# Patient Record
Sex: MALE
[De-identification: names, ages, dates, MRNs, and addresses within clinical notes are randomized per-mention and may not be internally consistent; named-entity substitution may affect disease eponyms.]

---

## 2022-12-20 PROBLEM — Z00.00 ENCOUNTER FOR PREVENTIVE HEALTH EXAMINATION: Status: ACTIVE | Noted: 2022-12-20

## 2023-01-04 ENCOUNTER — APPOINTMENT (OUTPATIENT)
Dept: HEART AND VASCULAR | Facility: CLINIC | Age: 83
End: 2023-01-04
Payer: MEDICARE

## 2023-01-04 ENCOUNTER — NON-APPOINTMENT (OUTPATIENT)
Age: 83
End: 2023-01-04

## 2023-01-04 VITALS
HEIGHT: 71 IN | SYSTOLIC BLOOD PRESSURE: 122 MMHG | BODY MASS INDEX: 27.02 KG/M2 | DIASTOLIC BLOOD PRESSURE: 84 MMHG | HEART RATE: 50 BPM | RESPIRATION RATE: 12 BRPM | WEIGHT: 193 LBS

## 2023-01-04 DIAGNOSIS — I48.92 UNSPECIFIED ATRIAL FLUTTER: ICD-10-CM

## 2023-01-04 DIAGNOSIS — Z78.9 OTHER SPECIFIED HEALTH STATUS: ICD-10-CM

## 2023-01-04 DIAGNOSIS — R01.1 CARDIAC MURMUR, UNSPECIFIED: ICD-10-CM

## 2023-01-04 DIAGNOSIS — Z82.49 FAMILY HISTORY OF ISCHEMIC HEART DISEASE AND OTHER DISEASES OF THE CIRCULATORY SYSTEM: ICD-10-CM

## 2023-01-04 DIAGNOSIS — Z87.438 PERSONAL HISTORY OF OTHER DISEASES OF MALE GENITAL ORGANS: ICD-10-CM

## 2023-01-04 DIAGNOSIS — R09.89 OTHER SPECIFIED SYMPTOMS AND SIGNS INVOLVING THE CIRCULATORY AND RESPIRATORY SYSTEMS: ICD-10-CM

## 2023-01-04 PROCEDURE — 93306 TTE W/DOPPLER COMPLETE: CPT

## 2023-01-04 PROCEDURE — 93880 EXTRACRANIAL BILAT STUDY: CPT

## 2023-01-04 PROCEDURE — 93000 ELECTROCARDIOGRAM COMPLETE: CPT

## 2023-01-04 PROCEDURE — 99204 OFFICE O/P NEW MOD 45 MIN: CPT

## 2023-01-04 RX ORDER — APIXABAN 5 MG/1
5 TABLET, FILM COATED ORAL
Qty: 60 | Refills: 3 | Status: ACTIVE | COMMUNITY

## 2023-01-04 RX ORDER — SILODOSIN 8 MG/1
8 CAPSULE ORAL
Refills: 0 | Status: ACTIVE | COMMUNITY

## 2023-01-04 RX ORDER — METOPROLOL SUCCINATE 50 MG/1
50 TABLET, EXTENDED RELEASE ORAL DAILY
Refills: 0 | Status: ACTIVE | COMMUNITY

## 2023-01-04 RX ORDER — FINASTERIDE 5 MG/1
5 TABLET, FILM COATED ORAL
Refills: 0 | Status: ACTIVE | COMMUNITY

## 2023-01-04 NOTE — HISTORY OF PRESENT ILLNESS
[FreeTextEntry1] : 82-year-old male with a past medical history of paroxysmal atrial flutter BPH comes in for routine care.  Overall, feels well denies chest pain shortness of breath PND orthopnea.

## 2023-01-04 NOTE — PHYSICAL EXAM

## 2023-01-04 NOTE — DISCUSSION/SUMMARY
[FreeTextEntry1] : 1.  Atrial flutter: EKG reviewed now sinus bradycardia with deep inferolateral T wave inversions consistent with LVH and strain possible HOCM.  We will obtain an echocardiogram and advise once results are known.  For now continue metoprolol and Eliquis as currently dosed.\par 2.  Cardiac murmur: See above\par 3.  Carotid bruit: Carotid duplex [EKG obtained to assist in diagnosis and management of assessed problem(s)] : EKG obtained to assist in diagnosis and management of assessed problem(s)

## 2025-04-01 ENCOUNTER — APPOINTMENT (OUTPATIENT)
Dept: HEART AND VASCULAR | Facility: CLINIC | Age: 85
End: 2025-04-01

## 2025-04-01 ENCOUNTER — NON-APPOINTMENT (OUTPATIENT)
Age: 85
End: 2025-04-01

## 2025-04-01 ENCOUNTER — APPOINTMENT (OUTPATIENT)
Dept: HEART AND VASCULAR | Facility: CLINIC | Age: 85
End: 2025-04-01
Payer: MEDICARE

## 2025-04-01 VITALS
SYSTOLIC BLOOD PRESSURE: 116 MMHG | HEART RATE: 66 BPM | DIASTOLIC BLOOD PRESSURE: 84 MMHG | RESPIRATION RATE: 12 BRPM | WEIGHT: 188 LBS | HEIGHT: 71 IN | BODY MASS INDEX: 26.32 KG/M2

## 2025-04-01 DIAGNOSIS — I48.92 UNSPECIFIED ATRIAL FLUTTER: ICD-10-CM

## 2025-04-01 DIAGNOSIS — Z01.810 ENCOUNTER FOR PREPROCEDURAL CARDIOVASCULAR EXAMINATION: ICD-10-CM

## 2025-04-01 DIAGNOSIS — R09.89 OTHER SPECIFIED SYMPTOMS AND SIGNS INVOLVING THE CIRCULATORY AND RESPIRATORY SYSTEMS: ICD-10-CM

## 2025-04-01 DIAGNOSIS — R01.1 CARDIAC MURMUR, UNSPECIFIED: ICD-10-CM

## 2025-04-01 PROCEDURE — 93000 ELECTROCARDIOGRAM COMPLETE: CPT | Mod: NC

## 2025-04-01 PROCEDURE — 93306 TTE W/DOPPLER COMPLETE: CPT

## 2025-04-01 PROCEDURE — 99214 OFFICE O/P EST MOD 30 MIN: CPT

## 2025-04-01 PROCEDURE — G2211 COMPLEX E/M VISIT ADD ON: CPT

## 2025-04-01 RX ORDER — ALLOPURINOL 300 MG/1
300 TABLET ORAL
Refills: 0 | Status: ACTIVE | COMMUNITY

## 2025-04-09 ENCOUNTER — APPOINTMENT (OUTPATIENT)
Facility: CLINIC | Age: 85
End: 2025-04-09

## 2025-04-16 ENCOUNTER — APPOINTMENT (OUTPATIENT)
Dept: HEART AND VASCULAR | Facility: CLINIC | Age: 85
End: 2025-04-16
Payer: MEDICARE

## 2025-04-16 DIAGNOSIS — Z01.810 ENCOUNTER FOR PREPROCEDURAL CARDIOVASCULAR EXAMINATION: ICD-10-CM

## 2025-04-16 PROCEDURE — 93015 CV STRESS TEST SUPVJ I&R: CPT

## 2025-04-16 PROCEDURE — 96374 THER/PROPH/DIAG INJ IV PUSH: CPT | Mod: 59

## 2025-04-16 PROCEDURE — 78452 HT MUSCLE IMAGE SPECT MULT: CPT

## 2025-04-16 PROCEDURE — A9500: CPT | Mod: JZ

## 2025-05-02 ENCOUNTER — NON-APPOINTMENT (OUTPATIENT)
Age: 85
End: 2025-05-02

## 2025-05-02 ENCOUNTER — OUTPATIENT (OUTPATIENT)
Dept: OUTPATIENT SERVICES | Facility: HOSPITAL | Age: 85
LOS: 1 days | End: 2025-05-02
Payer: MEDICARE

## 2025-05-02 ENCOUNTER — APPOINTMENT (OUTPATIENT)
Dept: SURGERY | Facility: CLINIC | Age: 85
End: 2025-05-02
Payer: MEDICARE

## 2025-05-02 VITALS
WEIGHT: 193 LBS | DIASTOLIC BLOOD PRESSURE: 71 MMHG | HEIGHT: 71 IN | HEART RATE: 51 BPM | TEMPERATURE: 97.5 F | SYSTOLIC BLOOD PRESSURE: 118 MMHG | OXYGEN SATURATION: 98 % | BODY MASS INDEX: 27.02 KG/M2

## 2025-05-02 DIAGNOSIS — Z01.818 ENCOUNTER FOR OTHER PREPROCEDURAL EXAMINATION: ICD-10-CM

## 2025-05-02 DIAGNOSIS — K40.91 UNILATERAL INGUINAL HERNIA, W/OUT OBSTRUCTION OR GANGRENE, RECURRENT: ICD-10-CM

## 2025-05-02 DIAGNOSIS — K46.9 UNSPECIFIED ABDOMINAL HERNIA W/OUT OBSTRUCTION OR GANGRENE: ICD-10-CM

## 2025-05-02 DIAGNOSIS — Z87.19 OTHER SPECIFIED POSTPROCEDURAL STATES: ICD-10-CM

## 2025-05-02 DIAGNOSIS — Z98.890 OTHER SPECIFIED POSTPROCEDURAL STATES: ICD-10-CM

## 2025-05-02 LAB
ALBUMIN SERPL ELPH-MCNC: 4.1 G/DL
ALP BLD-CCNC: 103 U/L
ALT SERPL-CCNC: 16 U/L
ANION GAP SERPL CALC-SCNC: 10 MMOL/L
AST SERPL-CCNC: 24 U/L
BILIRUB SERPL-MCNC: 0.8 MG/DL
BUN SERPL-MCNC: 22 MG/DL
CALCIUM SERPL-MCNC: 8.9 MG/DL
CHLORIDE SERPL-SCNC: 105 MMOL/L
CO2 SERPL-SCNC: 25 MMOL/L
CREAT SERPL-MCNC: 1.12 MG/DL
EGFRCR SERPLBLD CKD-EPI 2021: 64 ML/MIN/1.73M2
GLUCOSE SERPL-MCNC: 92 MG/DL
POTASSIUM SERPL-SCNC: 4.9 MMOL/L
PROT SERPL-MCNC: 6.3 G/DL
SODIUM SERPL-SCNC: 141 MMOL/L

## 2025-05-02 PROCEDURE — 93010 ELECTROCARDIOGRAM REPORT: CPT | Mod: NC

## 2025-05-02 PROCEDURE — 93005 ELECTROCARDIOGRAM TRACING: CPT

## 2025-05-02 PROCEDURE — 99204 OFFICE O/P NEW MOD 45 MIN: CPT

## 2025-05-03 LAB
ESTIMATED AVERAGE GLUCOSE: 120 MG/DL
HBA1C MFR BLD HPLC: 5.8 %
HCT VFR BLD CALC: 38.3 %
HGB BLD-MCNC: 11.8 G/DL
MCHC RBC-ENTMCNC: 27.5 PG
MCHC RBC-ENTMCNC: 30.8 G/DL
MCV RBC AUTO: 89.3 FL
PLATELET # BLD AUTO: 228 K/UL
RBC # BLD: 4.29 M/UL
RBC # FLD: 14 %
WBC # FLD AUTO: 7.23 K/UL

## 2025-05-06 ENCOUNTER — APPOINTMENT (OUTPATIENT)
Dept: SURGERY | Facility: AMBULATORY SURGERY CENTER | Age: 85
End: 2025-05-06

## 2025-05-07 VITALS
TEMPERATURE: 97 F | HEART RATE: 49 BPM | RESPIRATION RATE: 16 BRPM | DIASTOLIC BLOOD PRESSURE: 69 MMHG | OXYGEN SATURATION: 97 % | WEIGHT: 194.89 LBS | SYSTOLIC BLOOD PRESSURE: 117 MMHG | HEIGHT: 71 IN

## 2025-05-07 RX ORDER — METOPROLOL SUCCINATE 50 MG/1
1 TABLET, EXTENDED RELEASE ORAL
Refills: 0 | DISCHARGE

## 2025-05-07 NOTE — ASU PATIENT PROFILE, ADULT - NSICDXPASTMEDICALHX_GEN_ALL_CORE_FT
PAST MEDICAL HISTORY:  Atrial flutter     BPH (benign prostatic hyperplasia)     Cardiac murmur      PAST MEDICAL HISTORY:  Atrial flutter     BPH (benign prostatic hyperplasia)     Cardiac murmur     Gout
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
## 2025-05-07 NOTE — ASU PATIENT PROFILE, ADULT - PAIN CHRONIC, PROFILE
Patient with decreased hearing, both ears. Neither tympanic membrane can be visualized. Patient states primary care could not clean ears or get it all in the past. Cerumen impactions debrided of both ears with the microscope with typical effort, suctions and curettes. Ear canals and eardrums normal after removal. This took 10 minutes.      
no

## 2025-05-08 ENCOUNTER — APPOINTMENT (OUTPATIENT)
Dept: SURGERY | Facility: HOSPITAL | Age: 85
End: 2025-05-08

## 2025-05-08 ENCOUNTER — OUTPATIENT (OUTPATIENT)
Dept: OUTPATIENT SERVICES | Facility: HOSPITAL | Age: 85
LOS: 1 days | End: 2025-05-08
Payer: MEDICARE

## 2025-05-08 ENCOUNTER — TRANSCRIPTION ENCOUNTER (OUTPATIENT)
Age: 85
End: 2025-05-08

## 2025-05-08 VITALS — HEART RATE: 72 BPM | OXYGEN SATURATION: 98 % | RESPIRATION RATE: 13 BRPM

## 2025-05-08 DIAGNOSIS — Z87.81 PERSONAL HISTORY OF (HEALED) TRAUMATIC FRACTURE: Chronic | ICD-10-CM

## 2025-05-08 DIAGNOSIS — Z98.890 OTHER SPECIFIED POSTPROCEDURAL STATES: Chronic | ICD-10-CM

## 2025-05-08 PROCEDURE — S2900: CPT

## 2025-05-08 PROCEDURE — 49651 LAP ING HERNIA REPAIR RECUR: CPT | Mod: AS,RT

## 2025-05-08 PROCEDURE — 49550 RPR REM HERNIA INIT REDUCE: CPT | Mod: RT

## 2025-05-08 PROCEDURE — C1781: CPT

## 2025-05-08 PROCEDURE — 49651 LAP ING HERNIA REPAIR RECUR: CPT | Mod: RT

## 2025-05-08 PROCEDURE — 49650 LAP ING HERNIA REPAIR INIT: CPT | Mod: AS,RT

## 2025-05-08 PROCEDURE — 36415 COLL VENOUS BLD VENIPUNCTURE: CPT

## 2025-05-08 PROCEDURE — 85610 PROTHROMBIN TIME: CPT

## 2025-05-08 PROCEDURE — C1727: CPT

## 2025-05-08 PROCEDURE — 85730 THROMBOPLASTIN TIME PARTIAL: CPT

## 2025-05-08 PROCEDURE — 49659 UNLSTD LAPS PX HRNAP HRNRPHY: CPT

## 2025-05-08 DEVICE — TROCAR COVIDIEN SPACEMAKER PRO BLUNT TIP 10MM-12MM WITH DISSECTION BALLOON OVAL: Type: IMPLANTABLE DEVICE | Site: RIGHT | Status: FUNCTIONAL

## 2025-05-08 DEVICE — MESH HERNIA INGUINAL 3DMAX EXTRA LARGE 12 X 17CM RIGHT: Type: IMPLANTABLE DEVICE | Site: RIGHT | Status: FUNCTIONAL

## 2025-05-08 RX ORDER — METOPROLOL SUCCINATE 50 MG/1
1 TABLET, EXTENDED RELEASE ORAL
Refills: 0 | DISCHARGE

## 2025-05-08 RX ORDER — ACETAMINOPHEN 500 MG/5ML
650 LIQUID (ML) ORAL ONCE
Refills: 0 | Status: DISCONTINUED | OUTPATIENT
Start: 2025-05-08 | End: 2025-05-08

## 2025-05-08 RX ORDER — DOCUSATE SODIUM 100 MG
1 CAPSULE ORAL
Qty: 20 | Refills: 0
Start: 2025-05-08

## 2025-05-08 RX ORDER — HYDROMORPHONE/SOD CHLOR,ISO/PF 2 MG/10 ML
0.25 SYRINGE (ML) INJECTION
Refills: 0 | Status: DISCONTINUED | OUTPATIENT
Start: 2025-05-08 | End: 2025-05-08

## 2025-05-08 RX ORDER — OXYCODONE HYDROCHLORIDE 30 MG/1
5 TABLET ORAL ONCE
Refills: 0 | Status: DISCONTINUED | OUTPATIENT
Start: 2025-05-08 | End: 2025-05-08

## 2025-05-08 RX ORDER — SODIUM CHLORIDE 9 G/1000ML
1000 INJECTION, SOLUTION INTRAVENOUS
Refills: 0 | Status: DISCONTINUED | OUTPATIENT
Start: 2025-05-08 | End: 2025-05-08

## 2025-05-08 NOTE — BRIEF OPERATIVE NOTE - NSICDXBRIEFPREOP_GEN_ALL_CORE_FT
PRE-OP DIAGNOSIS:  Right inguinal hernia 08-May-2025 07:23:22  Adiel Lin   PRE-OP DIAGNOSIS:  Recurrent inguinal hernia 08-May-2025 09:56:13  Ria Randolph

## 2025-05-08 NOTE — PROGRESS NOTE ADULT - SUBJECTIVE AND OBJECTIVE BOX
General Surgery Post-Op Note    Procedure: RA R IHR    Diagnosis/Indication: Inguinal hernia    Surgeon: Dr. Nicholson    S: Patient has no complaints. Pain controlled with medication, only feels mild discomfort. Denies nausea, vomiting, headache, dizziness, CP, SOB.    O:  T(C): 36.5 (05-08-25 @ 09:37), Max: 36.5 (05-08-25 @ 09:37)  T(F): 97.7 (05-08-25 @ 09:37), Max: 97.7 (05-08-25 @ 09:37)  HR: 60 (05-08-25 @ 11:37) (55 - 66)  BP: 127/62 (05-08-25 @ 11:37) (127/62 - 158/70)  RR: 13 (05-08-25 @ 11:37) (12 - 14)  SpO2: 96% (05-08-25 @ 11:37) (94% - 99%)  Wt(kg): --            Gen: NAD, resting comfortably in bed  C/V: NSR  Pulm: Nonlabored breathing, no respiratory distress  Abd: Soft, mild ivana-incisional tenderness, ND, Incisions C/D/I, scrotal support in place  Extrem: WWP, no calf edema, SCDs in place      A/P: 84yMale s/p above procedure doing well post-operatively, pending voiding.  Diet: Regular  IVF: IVHL  Pain/nausea control  DVT ppx: SCDs/OOBA  Dispo plan: Discharge

## 2025-05-08 NOTE — ASU DISCHARGE PLAN (ADULT/PEDIATRIC) - CARE PROVIDER_API CALL
Lyn Oscar, [facility]  [address], [address]  [place] [address]  Phone: [phone]  Fax: [phone]  Follow Up Time:

## 2025-05-08 NOTE — ASU DISCHARGE PLAN (ADULT/PEDIATRIC) - ASU DC SPECIAL INSTRUCTIONSFT
Follow up with Dr. Nicholson in 1-2 weeks. Call the office to schedule your appointment.    Please resume all regular home medications unless specifically advised not to take a particular medication. Also, please take any new medications as prescribed.    You should resume your eliquis the evening of 5/10.     To help reduce pain and inflammation, you can take tylenol and/or ibuprofen per the instructions on the bottle. You also have journavx to help reduce pain.     If you develop constipation, please use the prescribed stool softener until this resolves.     It is expected to have swelling and bruising of the scrotum after this operation. Please wear the scrotal support provided or tight briefs to help reduce this.     Please get plenty of rest, continue to ambulate several times per day, and drink adequate amounts of fluids. Avoid lifting weights greater than 5-10 lbs until you follow-up with your surgeon, who will instruct you further regarding activity restrictions. Avoid driving or operating heavy machinery while taking pain medications. You may shower letting soap and water run over incisions. Pat dry with clean towel when finished.    Call the office if you experience increasing abdominal pain, nausea, vomiting, or temperature >101 F. Follow up with Dr. Nicholson in 1-2 weeks. Call the office to schedule your appointment.    Please resume all regular home medications unless specifically advised not to take a particular medication. Also, please take any new medications as prescribed.    You should resume your eliquis the evening of 5/10.     To help reduce pain and inflammation, you can take tylenol and/or ibuprofen per the instructions on the bottle. You also have journavx to help reduce pain.    If you develop constipation, please use the prescribed stool softener until this resolves.     It is expected to have swelling and bruising of the scrotum after this operation. Please wear the scrotal support provided or tight briefs to help reduce this.     Please get plenty of rest, continue to ambulate several times per day, and drink adequate amounts of fluids. Avoid lifting weights greater than 5-10 lbs until you follow-up with your surgeon, who will instruct you further regarding activity restrictions. Avoid driving or operating heavy machinery while taking pain medications. You may shower letting soap and water run over incisions. Pat dry with clean towel when finished.    Call the office if you experience increasing abdominal pain, nausea, vomiting, or temperature >101 F.      If needed, please follow-up with the [facility] at [facility]  - Address: Samuel Tovar, [address] [place] [address], [place] [address]  - Phone: [phone] Follow up with Dr. Nicholson in 1-2 weeks. Call the office to schedule your appointment.    Please resume all regular home medications unless specifically advised not to take a particular medication. Also, please take any new medications as prescribed.    You should resume your eliquis the evening of 5/10.     To help reduce pain and inflammation, you can take tylenol and/or ibuprofen per the instructions on the bottle. You also have journavx to help reduce pain.    If you develop constipation, please use the prescribed stool softener until this resolves.     It is expected to have swelling and bruising of the scrotum after this operation. Please wear the scrotal support provided or tight briefs to help reduce this.     Please get plenty of rest, continue to ambulate several times per day, and drink adequate amounts of fluids. Avoid lifting weights greater than 5-10 lbs until you follow-up with your surgeon, who will instruct you further regarding activity restrictions. Avoid driving or operating heavy machinery while taking pain medications. You may shower letting soap and water run over incisions. Pat dry with clean towel when finished.    Call the office if you experience increasing abdominal pain, nausea, vomiting, or temperature >101 F.      If needed, please follow-up with the [facility] for Urology at [address] [address] [phone]

## 2025-05-08 NOTE — ASU DISCHARGE PLAN (ADULT/PEDIATRIC) - NS MD DC FALL RISK RISK
For information on Fall & Injury Prevention, visit: [email or web address] OR  [email or web address] OR  https://www.cdc.gov/steadi/patient.html

## 2025-05-08 NOTE — BRIEF OPERATIVE NOTE - NSICDXBRIEFPOSTOP_GEN_ALL_CORE_FT
POST-OP DIAGNOSIS:  Right inguinal hernia 08-May-2025 07:23:26  Adiel Lin   POST-OP DIAGNOSIS:  Recurrent inguinal hernia 08-May-2025 09:56:29  Ria Randolph

## 2025-05-08 NOTE — ASU DISCHARGE PLAN (ADULT/PEDIATRIC) - FINANCIAL ASSISTANCE
[facility] provides services at a reduced cost to those who are determined to be eligible through [facility]’s financial assistance program. Information regarding [facility]’s financial assistance program can be found by going to [email or web address] or by calling [phone].

## 2025-05-12 ENCOUNTER — NON-APPOINTMENT (OUTPATIENT)
Age: 85
End: 2025-05-12

## 2025-05-13 ENCOUNTER — NON-APPOINTMENT (OUTPATIENT)
Age: 85
End: 2025-05-13

## 2025-05-13 ENCOUNTER — INPATIENT (INPATIENT)
Facility: HOSPITAL | Age: 85
LOS: 1 days | Discharge: HOME HEALTH | DRG: 699 | End: 2025-05-15
Attending: INTERNAL MEDICINE | Admitting: INTERNAL MEDICINE
Payer: MEDICARE

## 2025-05-13 VITALS
TEMPERATURE: 98 F | SYSTOLIC BLOOD PRESSURE: 149 MMHG | HEIGHT: 71 IN | OXYGEN SATURATION: 97 % | DIASTOLIC BLOOD PRESSURE: 73 MMHG | HEART RATE: 51 BPM | RESPIRATION RATE: 16 BRPM | WEIGHT: 205.03 LBS

## 2025-05-13 DIAGNOSIS — Z87.81 PERSONAL HISTORY OF (HEALED) TRAUMATIC FRACTURE: Chronic | ICD-10-CM

## 2025-05-13 DIAGNOSIS — Z98.890 OTHER SPECIFIED POSTPROCEDURAL STATES: Chronic | ICD-10-CM

## 2025-05-13 PROBLEM — R01.1 CARDIAC MURMUR, UNSPECIFIED: Chronic | Status: ACTIVE | Noted: 2025-05-07

## 2025-05-13 PROBLEM — N40.0 BENIGN PROSTATIC HYPERPLASIA WITHOUT LOWER URINARY TRACT SYMPTOMS: Chronic | Status: ACTIVE | Noted: 2025-05-07

## 2025-05-13 PROBLEM — I48.92 UNSPECIFIED ATRIAL FLUTTER: Chronic | Status: ACTIVE | Noted: 2025-05-07

## 2025-05-13 PROBLEM — M10.9 GOUT, UNSPECIFIED: Chronic | Status: ACTIVE | Noted: 2025-05-07

## 2025-05-13 LAB
ADD ON TEST-SPECIMEN IN LAB: SIGNIFICANT CHANGE UP
ALBUMIN SERPL ELPH-MCNC: 3.2 G/DL — LOW (ref 3.3–5)
ALP SERPL-CCNC: 83 U/L — SIGNIFICANT CHANGE UP (ref 40–120)
ALT FLD-CCNC: 7 U/L — LOW (ref 10–45)
ANION GAP SERPL CALC-SCNC: 15 MMOL/L — SIGNIFICANT CHANGE UP (ref 5–17)
ANION GAP SERPL CALC-SCNC: 17 MMOL/L — SIGNIFICANT CHANGE UP (ref 5–17)
ANION GAP SERPL CALC-SCNC: 18 MMOL/L — HIGH (ref 5–17)
APPEARANCE UR: CLEAR — SIGNIFICANT CHANGE UP
AST SERPL-CCNC: 13 U/L — SIGNIFICANT CHANGE UP (ref 10–40)
BASOPHILS # BLD AUTO: 0.02 K/UL — SIGNIFICANT CHANGE UP (ref 0–0.2)
BASOPHILS NFR BLD AUTO: 0.2 % — SIGNIFICANT CHANGE UP (ref 0–2)
BILIRUB SERPL-MCNC: 0.4 MG/DL — SIGNIFICANT CHANGE UP (ref 0.2–1.2)
BILIRUB UR-MCNC: NEGATIVE — SIGNIFICANT CHANGE UP
BUN SERPL-MCNC: 106 MG/DL — HIGH (ref 7–23)
BUN SERPL-MCNC: 86 MG/DL — HIGH (ref 7–23)
BUN SERPL-MCNC: 98 MG/DL — HIGH (ref 7–23)
CALCIUM SERPL-MCNC: 8.5 MG/DL — SIGNIFICANT CHANGE UP (ref 8.4–10.5)
CALCIUM SERPL-MCNC: 9 MG/DL — SIGNIFICANT CHANGE UP (ref 8.4–10.5)
CALCIUM SERPL-MCNC: 9.4 MG/DL — SIGNIFICANT CHANGE UP (ref 8.4–10.5)
CHLORIDE SERPL-SCNC: 100 MMOL/L — SIGNIFICANT CHANGE UP (ref 96–108)
CHLORIDE SERPL-SCNC: 93 MMOL/L — LOW (ref 96–108)
CHLORIDE SERPL-SCNC: 98 MMOL/L — SIGNIFICANT CHANGE UP (ref 96–108)
CO2 SERPL-SCNC: 17 MMOL/L — LOW (ref 22–31)
CO2 SERPL-SCNC: 17 MMOL/L — LOW (ref 22–31)
CO2 SERPL-SCNC: 18 MMOL/L — LOW (ref 22–31)
COLOR SPEC: ABNORMAL
CREAT ?TM UR-MCNC: 47 MG/DL — SIGNIFICANT CHANGE UP
CREAT SERPL-MCNC: 10.89 MG/DL — HIGH (ref 0.5–1.3)
CREAT SERPL-MCNC: 7.62 MG/DL — HIGH (ref 0.5–1.3)
CREAT SERPL-MCNC: 9.13 MG/DL — HIGH (ref 0.5–1.3)
DIFF PNL FLD: ABNORMAL
EGFR: 4 ML/MIN/1.73M2 — LOW
EGFR: 4 ML/MIN/1.73M2 — LOW
EGFR: 5 ML/MIN/1.73M2 — LOW
EGFR: 5 ML/MIN/1.73M2 — LOW
EGFR: 6 ML/MIN/1.73M2 — LOW
EGFR: 6 ML/MIN/1.73M2 — LOW
EOSINOPHIL # BLD AUTO: 0.05 K/UL — SIGNIFICANT CHANGE UP (ref 0–0.5)
EOSINOPHIL NFR BLD AUTO: 0.4 % — SIGNIFICANT CHANGE UP (ref 0–6)
GLUCOSE SERPL-MCNC: 100 MG/DL — HIGH (ref 70–99)
GLUCOSE SERPL-MCNC: 143 MG/DL — HIGH (ref 70–99)
GLUCOSE SERPL-MCNC: 89 MG/DL — SIGNIFICANT CHANGE UP (ref 70–99)
GLUCOSE UR QL: NEGATIVE MG/DL — SIGNIFICANT CHANGE UP
HCT VFR BLD CALC: 33.6 % — LOW (ref 39–50)
HGB BLD-MCNC: 11.2 G/DL — LOW (ref 13–17)
IMM GRANULOCYTES NFR BLD AUTO: 0.4 % — SIGNIFICANT CHANGE UP (ref 0–0.9)
KETONES UR QL: ABNORMAL MG/DL
LEUKOCYTE ESTERASE UR-ACNC: ABNORMAL
LYMPHOCYTES # BLD AUTO: 1.1 K/UL — SIGNIFICANT CHANGE UP (ref 1–3.3)
LYMPHOCYTES # BLD AUTO: 9.7 % — LOW (ref 13–44)
MAGNESIUM SERPL-MCNC: 2.4 MG/DL — SIGNIFICANT CHANGE UP (ref 1.6–2.6)
MCHC RBC-ENTMCNC: 28.4 PG — SIGNIFICANT CHANGE UP (ref 27–34)
MCHC RBC-ENTMCNC: 33.3 G/DL — SIGNIFICANT CHANGE UP (ref 32–36)
MCV RBC AUTO: 85.1 FL — SIGNIFICANT CHANGE UP (ref 80–100)
MONOCYTES # BLD AUTO: 1.05 K/UL — HIGH (ref 0–0.9)
MONOCYTES NFR BLD AUTO: 9.3 % — SIGNIFICANT CHANGE UP (ref 2–14)
NEUTROPHILS # BLD AUTO: 9.06 K/UL — HIGH (ref 1.8–7.4)
NEUTROPHILS NFR BLD AUTO: 80 % — HIGH (ref 43–77)
NITRITE UR-MCNC: NEGATIVE — SIGNIFICANT CHANGE UP
NRBC BLD AUTO-RTO: 0 /100 WBCS — SIGNIFICANT CHANGE UP (ref 0–0)
OSMOLALITY SERPL: 310 MOSM/KG — HIGH (ref 280–301)
OSMOLALITY UR: 209 MOSM/KG — LOW (ref 300–900)
PH UR: 5.5 — SIGNIFICANT CHANGE UP (ref 5–8)
PHOSPHATE SERPL-MCNC: 5.5 MG/DL — HIGH (ref 2.5–4.5)
PLATELET # BLD AUTO: 222 K/UL — SIGNIFICANT CHANGE UP (ref 150–400)
POTASSIUM SERPL-MCNC: 5.7 MMOL/L — HIGH (ref 3.5–5.3)
POTASSIUM SERPL-MCNC: 6 MMOL/L — HIGH (ref 3.5–5.3)
POTASSIUM SERPL-MCNC: 7.8 MMOL/L — CRITICAL HIGH (ref 3.5–5.3)
POTASSIUM SERPL-SCNC: 5.7 MMOL/L — HIGH (ref 3.5–5.3)
POTASSIUM SERPL-SCNC: 6 MMOL/L — HIGH (ref 3.5–5.3)
POTASSIUM SERPL-SCNC: 7.8 MMOL/L — CRITICAL HIGH (ref 3.5–5.3)
POTASSIUM UR-SCNC: 28 MMOL/L — SIGNIFICANT CHANGE UP
PROT ?TM UR-MCNC: 11 MG/DL — SIGNIFICANT CHANGE UP (ref 0–12)
PROT SERPL-MCNC: 6.1 G/DL — SIGNIFICANT CHANGE UP (ref 6–8.3)
PROT UR-MCNC: 100 MG/DL
PROT/CREAT UR-RTO: 0.2 RATIO — SIGNIFICANT CHANGE UP (ref 0–0.2)
RBC # BLD: 3.95 M/UL — LOW (ref 4.2–5.8)
RBC # FLD: 13 % — SIGNIFICANT CHANGE UP (ref 10.3–14.5)
SODIUM SERPL-SCNC: 127 MMOL/L — LOW (ref 135–145)
SODIUM SERPL-SCNC: 131 MMOL/L — LOW (ref 135–145)
SODIUM SERPL-SCNC: 135 MMOL/L — SIGNIFICANT CHANGE UP (ref 135–145)
SODIUM UR-SCNC: 21 MMOL/L — SIGNIFICANT CHANGE UP
SP GR SPEC: 1.01 — SIGNIFICANT CHANGE UP (ref 1–1.03)
UROBILINOGEN FLD QL: 0.2 MG/DL — SIGNIFICANT CHANGE UP (ref 0.2–1)
UUN UR-MCNC: 299 MG/DL — SIGNIFICANT CHANGE UP
WBC # BLD: 11.32 K/UL — HIGH (ref 3.8–10.5)
WBC # FLD AUTO: 11.32 K/UL — HIGH (ref 3.8–10.5)

## 2025-05-13 PROCEDURE — 93010 ELECTROCARDIOGRAM REPORT: CPT

## 2025-05-13 PROCEDURE — 99223 1ST HOSP IP/OBS HIGH 75: CPT

## 2025-05-13 PROCEDURE — 99291 CRITICAL CARE FIRST HOUR: CPT | Mod: FS

## 2025-05-13 RX ORDER — SODIUM ZIRCONIUM CYCLOSILICATE 5 G/5G
10 POWDER, FOR SUSPENSION ORAL ONCE
Refills: 0 | Status: COMPLETED | OUTPATIENT
Start: 2025-05-13 | End: 2025-05-13

## 2025-05-13 RX ORDER — SODIUM CHLORIDE 9 G/1000ML
1000 INJECTION, SOLUTION INTRAVENOUS
Refills: 0 | Status: DISCONTINUED | OUTPATIENT
Start: 2025-05-13 | End: 2025-05-14

## 2025-05-13 RX ORDER — SODIUM ZIRCONIUM CYCLOSILICATE 5 G/5G
10 POWDER, FOR SUSPENSION ORAL ONCE
Refills: 0 | Status: DISCONTINUED | OUTPATIENT
Start: 2025-05-14 | End: 2025-05-14

## 2025-05-13 RX ORDER — SODIUM ZIRCONIUM CYCLOSILICATE 5 G/5G
10 POWDER, FOR SUSPENSION ORAL ONCE
Refills: 0 | Status: DISCONTINUED | OUTPATIENT
Start: 2025-05-13 | End: 2025-05-13

## 2025-05-13 RX ORDER — CALCIUM GLUCONATE 20 MG/ML
1 INJECTION, SOLUTION INTRAVENOUS ONCE
Refills: 0 | Status: COMPLETED | OUTPATIENT
Start: 2025-05-13 | End: 2025-05-13

## 2025-05-13 RX ORDER — ACETAMINOPHEN 500 MG/5ML
650 LIQUID (ML) ORAL EVERY 6 HOURS
Refills: 0 | Status: DISCONTINUED | OUTPATIENT
Start: 2025-05-13 | End: 2025-05-15

## 2025-05-13 RX ORDER — DEXTROSE 50 % IN WATER 50 %
50 SYRINGE (ML) INTRAVENOUS ONCE
Refills: 0 | Status: COMPLETED | OUTPATIENT
Start: 2025-05-13 | End: 2025-05-13

## 2025-05-13 RX ORDER — SODIUM ZIRCONIUM CYCLOSILICATE 5 G/5G
10 POWDER, FOR SUSPENSION ORAL ONCE
Refills: 0 | Status: COMPLETED | OUTPATIENT
Start: 2025-05-14 | End: 2025-05-14

## 2025-05-13 RX ORDER — FUROSEMIDE 10 MG/ML
60 INJECTION INTRAMUSCULAR; INTRAVENOUS ONCE
Refills: 0 | Status: COMPLETED | OUTPATIENT
Start: 2025-05-13 | End: 2025-05-13

## 2025-05-13 RX ORDER — ALBUTEROL SULFATE 2.5 MG/3ML
2.5 VIAL, NEBULIZER (ML) INHALATION ONCE
Refills: 0 | Status: COMPLETED | OUTPATIENT
Start: 2025-05-13 | End: 2025-05-13

## 2025-05-13 RX ORDER — MELATONIN 5 MG
3 TABLET ORAL AT BEDTIME
Refills: 0 | Status: DISCONTINUED | OUTPATIENT
Start: 2025-05-13 | End: 2025-05-15

## 2025-05-13 RX ORDER — DEXTROSE 50 % IN WATER 50 %
50 SYRINGE (ML) INTRAVENOUS ONCE
Refills: 0 | Status: DISCONTINUED | OUTPATIENT
Start: 2025-05-13 | End: 2025-05-13

## 2025-05-13 RX ADMIN — SODIUM ZIRCONIUM CYCLOSILICATE 10 GRAM(S): 5 POWDER, FOR SUSPENSION ORAL at 19:22

## 2025-05-13 RX ADMIN — SODIUM ZIRCONIUM CYCLOSILICATE 10 GRAM(S): 5 POWDER, FOR SUSPENSION ORAL at 15:12

## 2025-05-13 RX ADMIN — SODIUM ZIRCONIUM CYCLOSILICATE 10 GRAM(S): 5 POWDER, FOR SUSPENSION ORAL at 21:37

## 2025-05-13 RX ADMIN — SODIUM CHLORIDE 300 MILLILITER(S): 9 INJECTION, SOLUTION INTRAVENOUS at 22:52

## 2025-05-13 RX ADMIN — Medication 5 UNIT(S): at 15:27

## 2025-05-13 RX ADMIN — Medication 200 MILLILITER(S): at 17:02

## 2025-05-13 RX ADMIN — CALCIUM GLUCONATE 100 GRAM(S): 20 INJECTION, SOLUTION INTRAVENOUS at 17:00

## 2025-05-13 RX ADMIN — FUROSEMIDE 60 MILLIGRAM(S): 10 INJECTION INTRAMUSCULAR; INTRAVENOUS at 16:59

## 2025-05-13 RX ADMIN — CALCIUM GLUCONATE 100 GRAM(S): 20 INJECTION, SOLUTION INTRAVENOUS at 15:35

## 2025-05-13 RX ADMIN — Medication 200 MILLILITER(S): at 18:45

## 2025-05-13 RX ADMIN — Medication 50 MILLILITER(S): at 15:22

## 2025-05-13 RX ADMIN — Medication 300 MILLILITER(S): at 21:45

## 2025-05-13 RX ADMIN — Medication 100 MILLIGRAM(S): at 19:23

## 2025-05-13 RX ADMIN — Medication 2.5 MILLIGRAM(S): at 15:12

## 2025-05-13 NOTE — CONSULT NOTE ADULT - SUBJECTIVE AND OBJECTIVE BOX
83yo Male pt with PMH BPH, GOUT, afib (on Eliquis, last this AM), now POD 5 s/p RA RT inguinal hernia repair w/ mesh ([facility]), presents to the ED reporting x4-5 days of difficulty urinating, short/weak streams, worsened this AM prompting ED visit. Pt states he required straight cath post-op was instructed to straight cath at home as needed. Pt denies any fever, chills, CP, SOB, LE swelling.    In the ED, pt afebrile, nontachycardic, normotensive, and satting on RA.     On exam, abd soft, NT, ND, incisions c/d/i w/ surrounding ecchymosis.      Labs demonstrate WBC 11.3, Na 127, K 7.8, Cl 93, Cr 10.8    PMH: Gout, BPH  PSHx: RT inguinal hernia repair   Medications: Eliquis (last taken this AM), Metoprolol  Allergies: NA  Social Hx: Denies  Last colonoscopy: NA    T(C): 36.8 (05-13-25 @ 12:36), Max: 36.8 (05-13-25 @ 12:36)  HR: 51 (05-13-25 @ 12:36) (51 - 51)  BP: 149/73 (05-13-25 @ 12:36) (149/73 - 149/73)  RR: 16 (05-13-25 @ 12:36) (16 - 16)  SpO2: 97% (05-13-25 @ 12:36) (97% - 97%)    Physical Exam  General: AAOx3, NAD, laying comfortably in bed  Cardio: S1,S2, No MRG  Pulm: Nonlabored breathing  Abdomen: Soft, NT, ND, no rebound/guarding, incisions c/d/i  Extremities: WWP, peripheral pulses appreciated      LABS:                        11.2   11.32 )-----------( 222      ( 13 May 2025 13:54 )             33.6     05-13    x   |  x   |  x   ----------------------------<  89  x    |  x   |  x     Ca    8.5      13 May 2025 13:54

## 2025-05-13 NOTE — CONSULT NOTE ADULT - ASSESSMENT
Mr. Esquivel is an 84-year-old male with past medical history of A-fib on Eliquis, hypertension, BPH, gout who underwent right inguinal hernia repair on 5/8 with post procedural urinary retention leading to acute renal failure, hyperkalemia, and post-obstructive diuresis. ICU consulted for disposition in management of these conditions.     Neuro:   JERONIMO    Cardiac:   Atrial Fibrillation: Slow ventricular response at this time with Hr of 45-55, patient takes Toprol 25 daily and Eliquis 5 g PO BID.   - Hold Toprol given slow ventricular response, previously noted on Pre-operative EKG.   - Hold Eliquis given ongoing hematuria and concern for catheter placement, improving.     Hypertension: Not currently on antihypertensives outside of BPH medications and Toprol. Continue to monitor.     Pulmonary:   JERONIMO    Gastroenterology:   JERONIMO    Nephrology:   Acute renal Failure secondary to obstructive uropathy with chronic BPH c/b hyperkalemia: Patient with chronic BPH with post procedural urinary retention on 5/8, was meant to straight cath at home  but did not unsure if he was able to urinate in the last 5 days. He presents with 1500ccs on bladder scan, s/p capps placement with 2L output. FE urea/FENA with intrinsic disease, likely in the setting of severe obstruction.     - Nephrology consulted, appreciate recs.   - Urine studies sent, follow up FENA.   - Strict hourly urine outputs, attempt to replete 1/2 of hourly urine output with NS or LR.   - Continue to trend BMPs q4 hours, if potassium improves will have no dialysis needs.     Hyperkalemia: From acute renal failure no EKG changes noted.  - Management with Calcium Gluconate 2 grams, Insulin 5 units IVP, Albuterol inhaler, Lasix per Nephrology   - Continue with Lokelma 10 g q4 hours.   - Trend potassium every 4 hours to ensure no rebound.     Hyponatremia: Na of 127, patient with elevated total body water given renal failure vs SIADH from urinary obstruction. Interestingly serum osmolarity elevated at 310 though likely with increased BUN, urine osm low at 208 though this is after obstruction was relieved.  Patient asymptomatic. Lasix administered in the ED.   - Follow up repeat Na, will likely resolve.      # Metabolic Acidosis: Patient with HAGMA, likely in the setting of uremia as above. D/D of 0.78 so possible mixed picture, though at this time no symptoms to explain. Will likely resolve with correction of uremia.   - Continue to monitor.     Gout: Hold home allopurinol 300 mg daily, AM uric acid testing.     BPH: Given Acute renal failure hold Finasteride 5mg PO daily and Silodosin 8.   - Discuss restarting medications in the AM.     F: As above.   E: Avoid repletion.   N: Renal diet.   DVT ppx: SCDs  Dispo: Pending BMP results.      Mr. Esquivel is an 84-year-old male with past medical history of A-fib on Eliquis, hypertension, BPH, gout who underwent right inguinal hernia repair on 5/8 with post procedural urinary retention leading to acute renal failure, hyperkalemia, and post-obstructive diuresis. ICU consulted for disposition in management of these conditions.     Neuro:   JERONIMO    Cardiac:   # Atrial Fibrillation: Slow ventricular response at this time with Hr of 45-55, patient takes Toprol 25 daily and Eliquis 5 g PO BID.   - Hold Toprol given slow ventricular response, previously noted on Pre-operative EKG.   - Hold Eliquis given ongoing hematuria and concern for catheter placement, improving.     # Hypertension: Not currently on antihypertensives outside of BPH medications and Toprol. Continue to monitor.     Pulmonary:   JERONIMO    Gastroenterology:   JERONIMO    Nephrology:   # Acute renal Failure secondary to obstructive uropathy with chronic BPH c/b hyperkalemia: Patient with chronic BPH with post procedural urinary retention on 5/8, was meant to straight cath at home  but did not unsure if he was able to urinate in the last 5 days. He presents with 1500ccs on bladder scan, s/p capps placement with 2L output. FE urea/FENA with intrinsic disease, likely in the setting of severe obstruction.     - Nephrology consulted, appreciate recs.   - Urine studies sent, follow up FENA.   - Strict hourly urine outputs, attempt to replete 1/2 of hourly urine output with NS or LR.   - Continue to trend BMPs q4 hours, if potassium improves will have no dialysis needs.     # Hyperkalemia: From acute renal failure no EKG changes noted.  - Management with Calcium Gluconate 2 grams, Insulin 5 units IVP, Albuterol inhaler, Lasix per Nephrology   - Continue with Lokelma 10 g q4 hours.   - Trend potassium every 4 hours to ensure no rebound.     # Hyponatremia: Na of 127, patient with elevated total body water given renal failure vs SIADH from urinary obstruction. Interestingly serum osmolarity elevated at 310 though likely with increased BUN, urine osm low at 208 though this is after obstruction was relieved.  Patient asymptomatic. Lasix administered in the ED.   - Follow up repeat Na, will likely resolve.      # Metabolic Acidosis: Patient with HAGMA, likely in the setting of uremia as above. D/D of 0.78 so possible mixed picture, though at this time no symptoms to explain. Will likely resolve with correction of uremia.   - Continue to monitor.     # Gout: Hold home allopurinol 300 mg daily, AM uric acid testing.     Urology:   # BPH: Given Acute renal failure hold Finasteride 5mg PO daily and Silodosin 8.   - Discuss restarting medications in the AM.     Heme:   # Anemia: Normocytic anemia at 11.3, down from 11.8 pre-surgery.   - Follow up routine cancer screening history.     F: As above.   E: Avoid repletion.   N: Renal diet.   DVT ppx: SCDs  Dispo: Pending BMP results.      Mr. Esquivel is an 84-year-old male with past medical history of A-fib on Eliquis, hypertension, BPH, gout who underwent right inguinal hernia repair on 5/8 with post procedural urinary retention leading to acute renal failure, hyperkalemia, and post-obstructive diuresis. ICU consulted for disposition in management of these conditions.     Neuro:   JERONIMO    Cardiac:   # Atrial Fibrillation: Slow ventricular response at this time with Hr of 45-55, patient takes Toprol 25 daily and Eliquis 5 g PO BID.   - Hold Toprol given slow ventricular response, previously noted on Pre-operative EKG.   - Hold Eliquis given ongoing hematuria and concern for catheter placement, improving.     # Hypertension: Not currently on antihypertensives outside of BPH medications and Toprol. Continue to monitor.     Pulmonary:   JERONIMO    Gastroenterology:   JERONIMO    Nephrology:   # Acute renal Failure secondary to obstructive uropathy with chronic BPH c/b hyperkalemia: Patient with chronic BPH with post procedural urinary retention on 5/8, was meant to straight cath at home  but did not unsure if he was able to urinate in the last 5 days. He presents with 1500ccs on bladder scan, s/p capps placement with 2L output. FE urea/FENA with intrinsic disease, likely in the setting of severe obstruction.     - Nephrology consulted, appreciate recs.   - Urine studies sent, follow up FENA.   - Strict hourly urine outputs, attempt to replete 1/2 of hourly urine output with NS or LR.   - Continue to trend BMPs q4 hours, if potassium improves will have no dialysis needs.     # Hyperkalemia: From acute renal failure no EKG changes noted.  - Management with Calcium Gluconate 2 grams, Insulin 5 units IVP, Albuterol inhaler, Lasix per Nephrology   - Continue with Lokelma 10 g q4 hours.   - Trend potassium every 4 hours to ensure no rebound.     # Hyponatremia: Na of 127, patient with elevated total body water given renal failure vs SIADH from urinary obstruction. Interestingly serum osmolarity elevated at 310 though likely with increased BUN, urine osm low at 208 though this is after obstruction was relieved.  Patient asymptomatic. Lasix administered in the ED.   - Follow up repeat Na, will likely resolve.      # Metabolic Acidosis: Patient with HAGMA, likely in the setting of uremia as above. D/D of 0.78 so possible mixed picture, though at this time no symptoms to explain. Will likely resolve with correction of uremia.   - Continue to monitor.     # Gout: Hold home allopurinol 300 mg daily, AM uric acid testing.     Urology:   # BPH: Given Acute renal failure hold Finasteride 5mg PO daily and Silodosin 8.   - Discuss restarting medications in the AM.     Heme:   # Anemia: Normocytic anemia at 11.3, down from 11.8 pre-surgery.   - Follow up routine cancer screening history.     F: As above.   E: Avoid repletion.   N: Renal diet.   DVT ppx: SCDs  Dispo: [facility]      Mr. Esquivel is an 84-year-old male with past medical history of A-fib on Eliquis, hypertension, BPH, gout who underwent right inguinal hernia repair on 5/8 with post procedural urinary retention leading to acute renal failure, hyperkalemia, and post-obstructive diuresis. ICU consulted for disposition in management of these conditions.     Neuro:   JERONIMO    Cardiac:   # Atrial Fibrillation: Slow ventricular response at this time with Hr of 45-55, patient takes Toprol 25 daily and Eliquis 5 g PO BID.   - Hold Toprol given slow ventricular response, previously noted on Pre-operative EKG.   - Hold Eliquis given ongoing hematuria and concern for catheter placement, improving.     # Hypertension: Not currently on antihypertensives outside of BPH medications and Toprol. Continue to monitor.     Pulmonary:   JERONIMO    Gastroenterology:   JERONIMO    Nephrology:   # Acute renal Failure secondary to obstructive uropathy with chronic BPH c/b hyperkalemia: Patient with chronic BPH with post procedural urinary retention on 5/8, was meant to straight cath at home  but did not unsure if he was able to urinate in the last 5 days. He presents with 1500ccs on bladder scan, s/p capps placement with 2L output. FE urea/FENA with intrinsic disease, likely in the setting of severe obstruction.     - Nephrology consulted, appreciate recs.   - Urine studies sent, follow up FENA.   - Strict hourly urine outputs, attempt to replete 1/2 of hourly urine output with NS or LR.   - Continue to trend BMPs q4 hours, if potassium improves will have no dialysis needs.     # Hyperkalemia: From acute renal failure no EKG changes noted.  - Management with Calcium Gluconate 2 grams, Insulin 5 units IVP, Albuterol inhaler, Lasix per Nephrology   - Continue with Lokelma 10 g q4 hours.   - Trend potassium every 4 hours to ensure no rebound.     # Hyponatremia: Na of 127, patient with elevated total body water given renal failure vs SIADH from urinary obstruction. Interestingly serum osmolarity elevated at 310 though likely with increased BUN, urine osm low at 208 though this is after obstruction was relieved.  Patient asymptomatic. Lasix administered in the ED.   - Follow up repeat Na, will likely resolve.      # Metabolic Acidosis: Patient with HAGMA, likely in the setting of uremia as above. D/D of 0.78 so possible mixed picture, though at this time no symptoms to explain. Will likely resolve with correction of uremia.   - Continue to monitor.     # Gout: Hold home allopurinol 300 mg daily, AM uric acid testing.     Urology:   # BPH: Given Acute renal failure hold Finasteride 5mg PO daily and Silodosin 8.   - Discuss restarting medications in the AM.     # Hematuria: Secondary to obstruction with ongoing AC use.   - Hold AC for tonight can restart once resolved.     Heme:   # Anemia: Normocytic anemia at 11.3, down from 11.8 pre-surgery.   - Follow up routine cancer screening history.     F: As above.   E: Avoid repletion.   N: Renal diet.   DVT ppx: SCDs  Dispo: [facility]

## 2025-05-13 NOTE — ED PROVIDER NOTE - CRITICAL CARE ATTENDING CONTRIBUTION TO CARE
84 M HTN, BPH s/p recent inguinal hernia surgery, with inability to urinate x 3 days now sent for eval of renal failure and hyperK.  Pt looks well, nad.  Fully bladder, mild ttp, no CVAT.  Hernia site clean dry intact w/o infection.  Labs noted with sig renal failure and hyperK.  EKG:  NSR, no peaked twaves no ischemia; CXR clear no effusions.  Renal and MICU consulted.  Samson placed. TX for hyperK improved numbers.  Will admit for further tx and workup.

## 2025-05-13 NOTE — H&P ADULT - ASSESSMENT
84-year-old male with past medical history of A-fib on Eliquis, hypertension, BPH, gout who underwent right inguinal hernia repair on 5/8 with post procedural urinary retention leading to acute renal failure, hyperkalemia, and post-obstructive diuresis. ICU consulted for disposition in management of these conditions, admitted to        Neuro:   JERONIMO    Cardiac:   # Atrial Fibrillation: Slow ventricular response at this time with Hr of 45-55, patient takes Toprol 25 daily and Eliquis 5 g PO BID.   - Hold Toprol given slow ventricular response, previously noted on Pre-operative EKG.   - Hold Eliquis given ongoing hematuria and concern for catheter placement, improving.     # Hypertension: Not currently on antihypertensives outside of BPH medications and Toprol. Continue to monitor.     Pulmonary:   JERONIMO    Gastroenterology:   JERONIMO    Nephrology:   # Acute renal Failure secondary to obstructive uropathy with chronic BPH c/b hyperkalemia: Patient with chronic BPH with post procedural urinary retention on 5/8, was meant to straight cath at home  but did not unsure if he was able to urinate in the last 5 days. He presents with 1500ccs on bladder scan, s/p capps placement with 2L output. FE urea/FENA with intrinsic disease, likely in the setting of severe obstruction.     - Nephrology consulted, appreciate recs.   - Urine studies sent, follow up FENA.   - Strict hourly urine outputs, attempt to replete 1/2 of hourly urine output with NS or LR.   - Continue to trend BMPs q4 hours, if potassium improves will have no dialysis needs.     # Hyperkalemia: From acute renal failure no EKG changes noted.  - Management with Calcium Gluconate 2 grams, Insulin 5 units IVP, Albuterol inhaler, Lasix per Nephrology   - Continue with Lokelma 10 g q4 hours.   - Trend potassium every 4 hours to ensure no rebound.     # Hyponatremia: Na of 127, patient with elevated total body water given renal failure vs SIADH from urinary obstruction. Interestingly serum osmolarity elevated at 310 though likely with increased BUN, urine osm low at 208 though this is after obstruction was relieved.  Patient asymptomatic. Lasix administered in the ED.   - Follow up repeat Na, will likely resolve.      # Metabolic Acidosis: Patient with HAGMA, likely in the setting of uremia as above. D/D of 0.78 so possible mixed picture, though at this time no symptoms to explain. Will likely resolve with correction of uremia.   - Continue to monitor.     # Gout: Hold home allopurinol 300 mg daily, AM uric acid testing.     Urology:   # BPH: Given Acute renal failure hold Finasteride 5mg PO daily and Silodosin 8.   - Discuss restarting medications in the AM.     # Hematuria: Secondary to obstruction with ongoing AC use.   - Hold AC for tonight can restart once resolved.     Heme:   # Anemia: Normocytic anemia at 11.3, down from 11.8 pre-surgery.   - Follow up routine cancer screening history.     F: As above.   E: Avoid repletion.   N: Renal diet.   DVT ppx: SCDs  Dispo: [facility]

## 2025-05-13 NOTE — H&P ADULT - NSICDXPASTMEDICALHX_GEN_ALL_CORE_FT
PAST MEDICAL HISTORY:  Atrial flutter     BPH (benign prostatic hyperplasia)     Cardiac murmur     Gout

## 2025-05-13 NOTE — ED PROVIDER NOTE - PHYSICAL EXAMINATION
CONSTITUTIONAL: Well-appearing;  in no apparent distress.   HEAD: Normocephalic; atraumatic.   EYES: PERRL; EOM intact; conjunctiva and sclera clear  ENT: normal nose; no rhinorrhea; normal pharynx with no erythema or lesions.   NECK: Supple; non-tender; no LAD  CARDIOVASCULAR: Normal S1, S2; No audible murmurs. Regular rate and rhythm.   RESPIRATORY: Breathing easily; breath sounds clear and equal bilaterally; no wheezes, rhonchi, or rales.  GI: Soft; +distension, + ecchymosis to lower abd, incisions healing well. bedside bladder scan 891ccs.   MSK: FROM at all extremities, normal tone   EXT: No cyanosis or edema; N/V intact  SKIN: Normal for age and race; warm; dry; good turgor; no apparent lesions or rash.   NEURO: A & O x 3; face symmetric; grossly unremarkable.   PSYCHOLOGICAL: The patient’s mood and manner are appropriate.

## 2025-05-13 NOTE — PATIENT PROFILE ADULT - FALL HARM RISK - DEVICES
[facility], [place]  BERNARD Consult      Erendira Pretty  YOB: 1947  Medical Record Number:  [id number]  Attending Physician:  Horacio Huerta MD   Current Principal Problem:  Paroxysmal atrial fibrillation    History     Cc: watchman    HPI  77 yoM CAD/CABG here for watchman placement.      3/24: AF noted 8/23. He was stared on rythmol but no rhythm control via CV attempt was made. He remained in AF 2/7/24, SR with long first degree AVB later in February. He had a vascular intervention in his abdomen 1/24 with subsequent GI bleeding later that month. He has been off OAC since the GI bleeding event. Xarelto was eventually restarted.      5/24: GI bleeding 4/27/24 with no source identified. Off xarelto since discharge. Remains in SR on rythmol with first degree AVB. Due for PVI 6/24/24, LAAO 6w later     10/24: No sustained recurrence. LAAO scheduled 11/8/24. He is on aspirin alone.     CHADSVASC of 4  HAS BLED of 4  EF 60-65%    Dysphagia or odynophagia:  No  Liver Disease, esophageal disease, or known varices:  yes but no varices on most recent upper  Upper GI Bleeding: yes  Snoring:  No  Sleep Apnea:  No  Prior neck surgery or radiation:  No  History of anesthetic difficulties:  No  Family history of anesthetic difficulties:  No  Last oral intake: yesterday before midnight  Able to move neck in all directions:  Yes    Medications - Outpatient  Prior to Admission medications    Medication Sig Start Date End Date Taking? Authorizing Provider   albuterol (PROVENTIL HFA) 90 mcg/actuation inhaler Inhale 2 puffs into the lungs every 6 (six) hours as needed for Wheezing. Rescue   Yes Provider, Historical   amLODIPine (NORVASC) 5 MG tablet Take 1 tablet (5 mg total) by mouth once daily. 10/22/24 10/17/25 Yes Frank Gallardo MD   aspirin (ECOTRIN) 81 MG EC tablet Take 1 tablet (81 mg total) by mouth once daily. 4/10/24 4/10/25 Yes Frank Gallardo MD   budesonide-formoterol 160-4.5  mcg (SYMBICORT) 160-4.5 mcg/actuation HFAA INHALE 2 PUFFS INTO THE LUNGS TWO TIMES A DAY. 5/10/24  Yes Rishi Molina MD   carvediloL (COREG) 3.125 MG tablet Take 1 tablet (3.125 mg total) by mouth 2 (two) times daily with meals. 9/28/24 9/28/25 Yes Frank Gallardo MD   cimetidine (TAGAMET) 300 MG tablet Take one tablet by mouth on 11/7/2024 at 4:15 pm, one tablet on 11/7/2024 at 10:15 pm, and then one tablet on 11/8/2024 at 5:15 am for pre-procedure 9/18/24  Yes Horacio Huerta MD   diphenhydrAMINE (BENADRYL) 50 MG capsule Take one tablet by mouth on 11/7/2024 at 4:15 pm, one tablet on 11/7/2024 at 10:15 pm, and then one tablet on 11/8/2024 at 5:15 am for pre-procedure 9/18/24  Yes Horacio Huerta MD   empagliflozin (JARDIANCE) 25 mg tablet Take 1 tablet (25 mg total) by mouth once daily. 8/20/24  Yes Bhupinder Callaway MD   famotidine (PEPCID) 40 MG tablet Take 40 mg by mouth once daily. 4/12/24  Yes Provider, Historical   finasteride (PROSCAR) 5 mg tablet TAKE 1 TABLET BY MOUTH once daily 6/11/24  Yes Bhupinder Callaway MD   furosemide (LASIX) 40 MG tablet Take 1 tablet (40 mg total) by mouth 2 (two) times daily. 10/16/24 10/16/25 Yes Kayleigh Paulino NP   GLUCOSAMINE HCL/CHONDR ROSARIO A NA (OSTEO BI-FLEX ORAL) Take 1 tablet by mouth 2 (two) times daily.    Yes Provider, Historical   krill oil 500 mg Cap Take 1 capsule by mouth Daily.   Yes Provider, Historical   LANTUS SOLOSTAR U-100 INSULIN 100 unit/mL (3 mL) InPn pen INJECT 44 UNITS UNDER THE SKIN EVERY EVENING 7/2/24  Yes Bhupinder Callaway MD   levothyroxine (SYNTHROID, LEVOTHROID) 175 MCG tablet Take 2 tablets (350 mcg total) by mouth before breakfast. 10/16/24 4/14/25 Yes Kayleigh Paulino NP   lisinopriL 10 MG tablet Take 1 tablet by mouth once daily 5/9/24  Yes Frank Gallardo MD   metFORMIN (GLUCOPHAGE-XR) 500 MG ER 24hr tablet Take 1 tablet (500 mg total) by mouth 2 (two) times daily with meals. 10/16/24 10/16/25  "Yes Bhupinder Callaway MD   mirabegron (MYRBETRIQ) 50 mg Tb24 Take 1 tablet (50 mg total) by mouth once daily. 10/17/24 4/15/25 Yes Cedrick Soler MD   montelukast (SINGULAIR) 10 mg tablet Take 1 tablet (10 mg total) by mouth once daily. 2/16/24 2/15/25 Yes Ann May PA-C   RABEprazole (ACIPHEX) 20 mg tablet Take 1 tablet (20 mg total) by mouth 2 (two) times daily. 10/31/24  Yes Bhupinder Callaway MD   ACCU-CHEK GRACE PLUS METER Misc AS DIRECTED 1/31/24   Bhupinder Callaway MD   blood sugar diagnostic (ACCU-CHEK GRACE PLUS TEST STRP) Strp TEST THREE TIMES A DAY 1/25/24   Bhupinder Callaway MD   clopidogreL (PLAVIX) 75 mg tablet Take 1 tablet (75 mg total) by mouth once daily. 11/6/24 11/6/25  Horacio Huerta MD   inclisiran (LEQVIO) 284 mg/1.5 mL Syrg subcutaneous injection Inject 1.5 mLs (284 mg total) into the skin every 3 (three) months. 5/7/24   Frank Gallardo MD   inclisiran (LEQVIO) 284 mg/1.5 mL Syrg subcutaneous injection Inject 1.5 mLs (284 mg total) into the skin every 6 (six) months. 7/19/24   Frank Gallardo MD   isosorbide mononitrate (IMDUR) 30 MG 24 hr tablet Take 1 tablet (30 mg total) by mouth once daily. 8/28/24 9/27/24  Tima Hernandez MD   lancets (ACCU-CHEK FASTCLIX LANCET DRUM) Misc TEST TWO TIMES A DAY 1/29/24   Bhupinder Callaway MD   pen needle, diabetic (BD ULTRA-FINE MINI PEN NEEDLE) 31 gauge x 3/16" Ndle USE AS DIRECTED 1/9/24   Bhupinder Callaway MD   ranolazine (RANEXA) 1,000 mg Tb12 Take 1 tablet (1,000 mg total) by mouth 2 (two) times daily. 3/25/24 3/25/25  Fausto Galaviz MD   REPATHA SURECLICK 140 mg/mL PnIj INJECT 140mg subcutaneously every 14 days 7/11/24   Frank Gallardo MD   rivaroxaban (XARELTO) 20 mg Tab Take 20 mg by mouth daily with dinner or evening meal.    Provider, Historical       Medications - Current  Scheduled Meds:  Continuous Infusions:    Allergies  Review of patient's allergies indicates:   Allergen " Reactions    Lipitor [atorvastatin] Other (See Comments)     Muscle aches and pains as well as fatigue.     Niacin preparations Other (See Comments)     Causes broken blood vessels and bruises at 4 times normal dose.    Statins-hmg-coa reductase inhibitors Other (See Comments)     Statins cause intolerable myalgias.    Iodinated contrast media Hives     Tachycardia    Omeprazole Hives and Itching    Prilosec [omeprazole magnesium] Hives and Itching     Past Medical History  Past Medical History:   Diagnosis Date    Anticoagulant long-term use     Aortic stenosis     Asthma     Benign prostatic hyperplasia with nocturia     Bilateral carotid artery stenosis 07/21/2021    US CAROTID BILATERAL 07/14/2021 IMPRESSION: Atherosclerosis with suspected stenosis greater than 50% at the right proximal ICA visually. Velocities are discordant and appear improved from prior. Atherosclerosis on the left with no evidence of flow-limiting stenosis greater than 50%.  FINDINGS: Right: Internal Carotid Artery (ICA): Peak systolic velocity 118 cm/sec. End diastolic velocity 35 cm/sec    BPH (benign prostatic hyperplasia)     CAD (coronary artery disease)     40% lesion 2002;[facility]    Cirrhosis     Claudication 04/09/2014    Colon polyp     COPD (chronic obstructive pulmonary disease)     ED (erectile dysfunction)     Encounter for blood transfusion     Ex-smoker     Hearing loss NEC     Hepatitis C     Cured;dr gibbs harvoni 2015    Hyperlipidemia     Hypertension     Hypothyroid     s/p tx graves    Open angle with borderline findings and low glaucoma risk in both eyes 09/22/2020    DELONTE on CPAP     Osteoarthritis     Paroxysmal atrial fibrillation     PVD (peripheral vascular disease)     Secondary esophageal varices without bleeding 06/26/2017    Type 2 diabetes mellitus      Past Surgical History  Past Surgical History:   Procedure Laterality Date    ABLATION OF ARRHYTHMOGENIC FOCUS FOR ATRIAL FIBRILLATION N/A 6/24/2024     Procedure: Ablation atrial fibrillation;  Surgeon: Horacio Huerta MD;  Location: [facility] EP LAB;  Service: Cardiology;  Laterality: N/A;  afib, PVI, RFA, BERNARD (cx if SR), ZIA, anes, MB, 3 Prep, 3 hours per Dr. Huerta    ANGIOGRAM, CORONARY, WITH LEFT HEART CATHETERIZATION N/A 8/27/2024    Procedure: Angiogram, Coronary, with Left Heart Cath;  Surgeon: Stanton Jefferson MD;  Location: [facility] CATH LAB;  Service: Cardiology;  Laterality: N/A;    ANGIOGRAM, EXTREMITY, UNILATERAL Left 1/4/2024    Procedure: ANGIOGRAM, EXTREMITY, UNILATERAL- Femoral / SMS Stent, Poss General;  Surgeon: ALEX Alfred III, MD;  Location: [facility] OR [address] [place] [address];  Service: Vascular;  Laterality: Left;  mGy : 2698.78  Gy.cm : 229.88  Contrast : 62ml  Fluro time : 13.8min    ARTERIOGRAPHY OF SUBCLAVIAN ARTERY  8/27/2024    Procedure: ARTERIOGRAM, SUBCLAVIAN;  Surgeon: Stanton Jefferson MD;  Location: [facility] CATH LAB;  Service: Cardiology;;    CARDIAC CATHETERIZATION      CARDIAC SURGERY      CARPAL TUNNEL RELEASE Left     CATARACT EXTRACTION      CATARACT EXTRACTION W/ INTRAOCULAR LENS  IMPLANT, BILATERAL  2008    CATHETERIZATION OF BOTH LEFT AND RIGHT HEART N/A 11/2/2018    Procedure: CATHETERIZATION, HEART, BOTH LEFT AND RIGHT;  Surgeon: Frank Gallardo MD;  Location: [facility] CATH LAB;  Service: Cardiology;  Laterality: N/A;    COLONOSCOPY  8/2013    COLONOSCOPY N/A 5/30/2016    Procedure: COLONOSCOPY;  Surgeon: Anna Tomas MD;  Location: [facility];  Service: Endoscopy;  Laterality: N/A;    COLONOSCOPY N/A 7/17/2019    Procedure: COLONOSCOPY;  Surgeon: David Carter III, MD;  Location: [facility] ENDO;  Service: Endoscopy;  Laterality: N/A;    COLONOSCOPY N/A 12/14/2023    Procedure: COLONOSCOPY;  Surgeon: Ama Barrera MD;  Location: [facility] ENDO;  Service: Endoscopy;  Laterality: N/A;    COLONOSCOPY N/A 7/27/2024    Procedure: COLONOSCOPY;  Surgeon: Ama Barrera MD;  Location: [facility];  Service: Endoscopy;  Laterality: N/A;     COMPUTED TOMOGRAPHY N/A 9/9/2019    Procedure: CT (COMPUTED TOMOGRAPHY);  Surgeon: Sri Diagnostic Provider;  Location: [facility] OR;  Service: General;  Laterality: N/A;  Microwave ablation to be done in CT.  Need CRNA and cart    COMPUTED TOMOGRAPHY N/A 1/25/2022    Procedure: Liver Microwave Ablation;  Surgeon: Red Puentes MD;  Location: [facility];  Service: General;  Laterality: N/A;    CORONARY ARTERY BYPASS GRAFT (CABG) N/A 11/5/2018    Procedure: CORONARY ARTERY BYPASS GRAFT (CABG);  Surgeon: Bear De Luna MD;  Location: [facility] OR;  Service: Cardiothoracic;  Laterality: N/A;  TWO VESSEL BYPASS WITH AORTOTOMY AND EXCISION OF ATHEROSCLEROTIC PLAQUE    CORONARY BYPASS GRAFT ANGIOGRAPHY  3/2/2020    Procedure: Bypass graft study;  Surgeon: Cora Cormier MD;  Location: [facility] CATH LAB;  Service: Cardiology;;    CORONARY BYPASS GRAFT ANGIOGRAPHY  8/27/2024    Procedure: Bypass graft study;  Surgeon: Stanton Jefferson MD;  Location: [facility] CATH LAB;  Service: Cardiology;;    ECHOCARDIOGRAM,TRANSESOPHAGEAL N/A 6/24/2024    Procedure: Transesophageal echo (BERNARD) intra-procedure log documentation;  Surgeon: Nacho Downs MD;  Location: [facility] EP LAB;  Service: Cardiology;  Laterality: N/A;    ELBOW BURSA SURGERY Right 2010    ENDOSCOPIC HARVEST OF VEIN Left 11/5/2018    Procedure: SURGICAL PROCUREMENT, VEIN, ENDOSCOPIC;  Surgeon: Bear De Luna MD;  Location: [facility] OR;  Service: Cardiothoracic;  Laterality: Left;    ESOPHAGOGASTRODUODENOSCOPY N/A 7/17/2019    Procedure: ESOPHAGOGASTRODUODENOSCOPY (EGD);  Surgeon: David Carter III, MD;  Location: [facility] ENDO;  Service: Endoscopy;  Laterality: N/A;    ESOPHAGOGASTRODUODENOSCOPY N/A 12/29/2022    Procedure: ESOPHAGOGASTRODUODENOSCOPY (EGD);  Surgeon: Issa Gayle MD;  Location: [facility] ENDO;  Service: Endoscopy;  Laterality: N/A;    ESOPHAGOGASTRODUODENOSCOPY N/A 1/17/2024    Procedure: EGD (ESOPHAGOGASTRODUODENOSCOPY);  Surgeon: Issa Gayle MD;   Location: [facility] ENDO;  Service: Endoscopy;  Laterality: N/A;    ESOPHAGOGASTRODUODENOSCOPY N/A 4/30/2024    Procedure: EGD (ESOPHAGOGASTRODUODENOSCOPY);  Surgeon: Eder Foley MD;  Location: [facility] ENDO;  Service: Gastroenterology;  Laterality: N/A;    ESOPHAGOGASTRODUODENOSCOPY N/A 7/27/2024    Procedure: EGD (ESOPHAGOGASTRODUODENOSCOPY);  Surgeon: Ama Barrera MD;  Location: [facility] ENDO;  Service: Endoscopy;  Laterality: N/A;    ETHMOIDECTOMY Bilateral 3/27/2019    Procedure: ETHMOIDECTOMY;  Surgeon: Alejandro Parkinson MD;  Location: [facility] OR;  Service: ENT;  Laterality: Bilateral;    EYE SURGERY      FOOT SURGERY      FRONTAL SINUS OBLITERATION Bilateral 3/27/2019    Procedure: SINUSOTOMY, FRONTAL SINUS, OBLITERATIVE;  Surgeon: Alejandro Parkinson MD;  Location: [facility] OR;  Service: ENT;  Laterality: Bilateral;    FUNCTIONAL ENDOSCOPIC SINUS SURGERY (FESS) Bilateral 3/27/2019    Procedure: FESS (FUNCTIONAL ENDOSCOPIC SINUS SURGERY);  Surgeon: Alejandro Parkinson MD;  Location: [facility] OR;  Service: ENT;  Laterality: Bilateral;  Left Keila bullosa resection     INTRALUMINAL GASTROINTESTINAL TRACT IMAGING VIA CAPSULE N/A 2/2/2024    Procedure: IMAGING PROCEDURE, GI TRACT, INTRALUMINAL, VIA CAPSULE;  Surgeon: Cooper Estrella RN;  Location: [facility];  Service: Endoscopy;  Laterality: N/A;    INTRALUMINAL GASTROINTESTINAL TRACT IMAGING VIA CAPSULE N/A 7/31/2024    Procedure: IMAGING PROCEDURE, GI TRACT, INTRALUMINAL, VIA CAPSULE;  Surgeon: Cooper Estrella RN;  Location: [facility];  Service: Endoscopy;  Laterality: N/A;    KNEE ARTHROSCOPY W/ MENISCAL REPAIR Left 06/2019    dr boykin    KNEE SURGERY Right     LEFT HEART CATHETERIZATION Left 3/2/2020    Procedure: CATHETERIZATION, HEART, LEFT;  Surgeon: Cora Cormier MD;  Location: [facility] CATH LAB;  Service: Cardiology;  Laterality: Left;    LIVER BIOPSY  01/2022    MAXILLARY ANTROSTOMY Bilateral 3/27/2019    Procedure: MAXILLARY ANTROSTOMY;  Surgeon: Alejandro Parkinson MD;  Location: [facility] OR;   Service: ENT;  Laterality: Bilateral;    NASAL SEPTOPLASTY N/A 3/27/2019    Procedure: SEPTOPLASTY, NOSE;  Surgeon: Alejandro Parkinson MD;  Location: [facility] OR;  Service: ENT;  Laterality: N/A;    RECTAL SURGERY  2012    RIGHT HEART CATHETERIZATION Right 8/27/2024    Procedure: INSERTION, CATHETER, RIGHT HEART;  Surgeon: Stanton Jefferson MD;  Location: [facility] CATH LAB;  Service: Cardiology;  Laterality: Right;    STENT, SUPERIOR MESENTERIC ARTERY Left 1/4/2024    Procedure: STENT, SUPERIOR MESENTERIC ARTERY;  Surgeon: ALEX Alfred III, MD;  Location: [facility] OR [address] [place] [address];  Service: Vascular;  Laterality: Left;    TRANSURETHRAL RESECTION OF PROSTATE (TURP) WITHOUT USE OF LASER N/A 6/19/2018    Procedure: TURP, WITHOUT USING LASER;  Surgeon: Cooper Cordova IV, MD;  Location: [facility];  Service: Urology;  Laterality: N/A;    TRIGGER FINGER RELEASE Left     VALVE STUDY-AORTIC  8/27/2024    Procedure: Valve study-aortic;  Surgeon: Stanton Jefferson MD;  Location: [facility] CATH LAB;  Service: Cardiology;;     ROS  10 point ROS performed and negative except as stated in HPI     Physical Examination   Vital Signs  Vitals  Temp: 98.4 °F (36.9 °C)  Temp Source: Temporal  Pulse: 79  Heart Rate Source: SpO2  Resp: 18  SpO2: 98 %  BP: (!) 168/82  MAP (mmHg): 117  BP Location: Right arm  BP Method: Automatic  Patient Position: Lying    24 Hour VS Range  Temp:  [98.4 °F (36.9 °C)]   Pulse:  [79]   Resp:  [18]   BP: (139-168)/(79-82)   SpO2:  [98 %]   No intake or output data in the 24 hours ending 11/08/24 0707      Physical Exam  HENT:      Head: Normocephalic and atraumatic.   Eyes:      Conjunctiva/sclera: Conjunctivae normal.   Cardiovascular:      Rate and Rhythm: Normal rate and regular rhythm.      Heart sounds: Normal heart sounds.   Pulmonary:      Effort: Pulmonary effort is normal. No respiratory distress.      Breath sounds: Normal breath sounds. No wheezing.   Abdominal:      General: There is no distension.      Palpations:  "Abdomen is soft.      Tenderness: There is no abdominal tenderness.   Musculoskeletal:      Cervical back: Normal range of motion.   Neurological:      Mental Status: He is alert and oriented to person, place, and time.   Psychiatric:         Mood and Affect: Affect normal.         Data   No results for input(s): "WBC", "HGB", "HCT", "PLT" in the last 168 hours.   No results for input(s): "PROTIME", "INR" in the last 168 hours.   No results for input(s): "NA", "K", "CL", "CO2", "BUN", "CREATININE", "ANIONGAP", "CALCIUM" in the last 168 hours.   Assessment & Plan     BERNARD for LEO assessment prior to watchman  -No absolute contraindications of esophageal stricture, tumor, perforation, laceration,or diverticulum and/or active GI bleed  -The risks, benefits & alternatives of the procedure were explained to the patient.   -The risks of transesophageal echo include but are not limited to:  Dental trauma, esophageal trauma/perforation, bleeding, laryngospasm/brochospasm, aspiration, sore throat/hoarseness, & dislodgement of the endotracheal tube/nasogastric tube (where applicable).    -The risks of ANES monitored sedation include hypotension, respiratory depression, arrhythmias, bronchospasm, & death.    -Informed consent was obtained. The patient is agreeable to proceed with the procedure and all questions and concerns addressed.    Case discussed with an attending in echocardiography lab.    Dmitri Delacruzor, MD  [facility]   Cardiovascular Disease PGY-V    " Cane
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
## 2025-05-13 NOTE — H&P ADULT - NSHPPHYSICALEXAM_GEN_ALL_CORE
Constitutional: Patient is in no acute distress, resting comfortably in bed.  HEENT: Atraumatic/normocephalic. Mucous membranes moist.   Neck: supple; no JVD.  Respiratory: Clear to auscultation bilaterally; no Wheezing/Crackles/Ronchi.  Cardiac: Regular rate and rhythm, S1/S2; 2+ systolic murmur consistent with ?AS.   Gastrointestinal: abdomen soft, non-tender and non-distended; Healing scattered abdominal incisions, non erythematous,   Extremities: Warm and Well perfused, slightly prolonged capillary refill at 3 seconds, 1-2+ peripheral edema.  Vascular: 2+ radial, Dorsalis pedis and posterior tibial pulses bilaterally.  Dermatologic: skin warm, dry and intact; no rashes, wounds, or scars  Neurologic: AAOx3;

## 2025-05-13 NOTE — ED PROVIDER NOTE - PROGRESS NOTE DETAILS
pt in ARF with Cr 10, . K 7.8. EKG with TWI II, lateral leads, no peaked Ts. Na 127. Pt given hyperk cocktail. Pt seen by renal and ICU. rpt labs improving, will admit to 7L

## 2025-05-13 NOTE — H&P ADULT - NSHPLABSRESULTS_GEN_ALL_CORE
.  LABS:                         11.2   11.32 )-----------( 222      ( 13 May 2025 13:54 )             33.6     05-13    131[L]  |  98  |  98[H]  ----------------------------<  143[H]  5.7[H]   |  18[L]  |  9.13[H]    Ca    9.0      13 May 2025 16:27  Mg     2.3     05-13    TPro  6.1  /  Alb  3.2[L]  /  TBili  0.4  /  DBili  x   /  AST  13  /  ALT  7[L]  /  AlkPhos  83  05-13      Urinalysis Basic - ( 13 May 2025 16:27 )    Color: x / Appearance: x / SG: x / pH: x  Gluc: 143 mg/dL / Ketone: x  / Bili: x / Urobili: x   Blood: x / Protein: x / Nitrite: x   Leuk Esterase: x / RBC: x / WBC x   Sq Epi: x / Non Sq Epi: x / Bacteria: x                RADIOLOGY, EKG & ADDITIONAL TESTS: Reviewed.

## 2025-05-13 NOTE — ED PROVIDER NOTE - CARE PLAN
Principal Discharge DX:	Urinary retention  Secondary Diagnosis:	Acute renal failure  Secondary Diagnosis:	Hyperkalemia  Secondary Diagnosis:	Hyponatremia   1

## 2025-05-13 NOTE — ED ADULT NURSE NOTE - OBJECTIVE STATEMENT
Received patient awake and alert times 4 resting in stretcher with complaint of urinary retention. Denies pain. States had surgery 5 days ago without complication. scrotal pouch in place for flaccid muscle. Folet placed as ordered by MD. Steadily flowing yellow urine without complication. Urine sent. Labs done. Care plan ongoing.

## 2025-05-13 NOTE — ED ADULT TRIAGE NOTE - PAIN: PRESENCE, MLM
Atherosclerosis of native coronary artery without angina pectoris  sp  CABG x 5     complains of pain/discomfort
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
## 2025-05-13 NOTE — ED ADULT NURSE NOTE - NSFALLUNIVINTERV_ED_ALL_ED
Bed/Stretcher in lowest position, wheels locked, appropriate side rails in place/Call bell, personal items and telephone in reach/Instruct patient to call for assistance before getting out of bed/chair/stretcher/Non-slip footwear applied when patient is off stretcher/[place] to call system/Physically safe environment - no spills, clutter or unnecessary equipment/Purposeful proactive rounding/Room/bathroom lighting operational, light cord in reach

## 2025-05-13 NOTE — PATIENT PROFILE ADULT - PUBLIC BENEFITS
[facility] - Cardiology Stepdown  Nephrology  Progress Note    Patient Name: Xena Vera  MRN: [id number]  Admission Date: 4/1/2024  Hospital Length of Stay: 33 days  Attending Provider: Rosario Luciano MD   Primary Care Physician: Tami Villeda FNP  Principal Problem:Severe mitral valve regurgitation    Subjective:     HPI: Ms Vera is an obese (BMI ~31) 53-year-old with CKD IV (baseline creatinine ~3.1-3.3), prediabetes with most recent hemoglobin A1c 6%, HFpEF (most recent TTE with EF 60-65% with G2DD), mitral valve regurgitation, anemia, hypertension, HLD as well as several over co-morbid conditions who was admitted on 4/1 with heart failure exacerbation and hypervolemia after presenting with to ED following a mechanical fall but also had complaints of progressive dyspnea/ZACARIAS, orthopnea, lower extremity edema and abdominal distension with constipation. On presentation patient was noted to be hypotensive with systolic BP readings as low as 90s mmHg and bradycardiac with HR as low as 50s bpm. There was concern for some edema on chest x-ray and BNP at that time was ~4.6K and metabolic panel was notable for serum sodium 133, bicarbonate 20, , creatinine 4.2, albumin 3.1 and total bilirubin 1.5. UA showed +1 protein. Her admission was complicated by failure to adequately diuresis with escalating IV Lasix and even oral metolazone for which Nephrology was consulted on 4/10 for assistance. She explicitly denies NSAID use as outpatient.    Interval History: Patient seen and examined. No acute events overnight. Plans to discharge soon per primary team. Last iHD session on 5/3 with UF 1 liter. Afebrile with pulse ranging from 60-50s bpm. Systolic blood pressures ranging from 120-100s mmHg. She is saturating + 92% on room air with documented UOP of 950 mL in the last 24 hours. Plan for iHD again today prior to discharge.      Review of patient's allergies indicates:  No Known Allergies  Current  Facility-Administered Medications   Medication Dose Route Frequency Provider Last Rate Last Admin    acetaminophen tablet 650 mg  650 mg Oral Q6H PRN Ness Regan MD   650 mg at 04/30/24 0344    aspirin EC tablet 81 mg  81 mg Oral Daily David Borjas, PA-C   81 mg at 05/05/24 0833    atorvastatin tablet 40 mg  40 mg Oral Daily David Borjas, PA-C   40 mg at 05/05/24 0833    bisacodyL EC tablet 5 mg  5 mg Oral Daily PRN Johnny Cisneros MD        carvediloL tablet 6.25 mg  6.25 mg Oral BID Ness Regan MD   6.25 mg at 05/05/24 2025    clindamycin phosphate 1% gel   Topical (Top) BID Domonique Walden MD   Given at 05/05/24 2026    dextrose 10% bolus 125 mL 125 mL  12.5 g Intravenous PRN David Borjas, PA-DIMITRY        dextrose 10% bolus 250 mL 250 mL  25 g Intravenous PRN David Borjas PA-DIMITRY        diphenhydrAMINE-zinc acetate 2-0.1% cream   Topical (Top) TID PRN Domonique Walden MD   Given at 04/22/24 1556    furosemide tablet 80 mg  80 mg Oral BID Ness Regan MD   80 mg at 05/05/24 1514    glucagon (human recombinant) injection 1 mg  1 mg Intramuscular PRN David Bojras PA-DIMITRY        glucose chewable tablet 16 g  16 g Oral PRN David Borjas PA-DIMITRY        glucose chewable tablet 24 g  24 g Oral PRN David Borjas PA-C        heparin (porcine) injection 1,000 Units  1,000 Units Intra-Catheter PRN Rosario Luciano MD   1,000 Units at 05/03/24 1645    heparin (porcine) injection 5,000 Units  5,000 Units Subcutaneous Q8H Rosario Luciano MD   5,000 Units at 05/06/24 0549    HYDROcodone-acetaminophen 5-325 mg per tablet 1 tablet  1 tablet Oral Q6H PRN Johnny Cisneros MD   1 tablet at 04/28/24 2136    hydrOXYzine HCL tablet 10 mg  10 mg Oral TID PRN Domonique Walden MD   10 mg at 05/03/24 2139    melatonin tablet 6 mg  6 mg Oral Nightly PRN Pietro Herring MD   6 mg at 04/18/24 2047    miconazole 2 % cream   Topical (Top) BID Domonique aWlden MD   Given at 05/05/24  2026    midodrine tablet 2.5 mg  2.5 mg Oral Q12H Ness Regan MD   2.5 mg at 05/05/24 0832    midodrine tablet 5 mg  5 mg Oral Daily PRN Ness Regan MD   5 mg at 05/03/24 1327    naloxone 0.4 mg/mL injection 0.02 mg  0.02 mg Intravenous PRN David Borjas PA-C        ondansetron disintegrating tablet 4 mg  4 mg Oral Q8H PRN David Borjas PA-C        ondansetron injection 4 mg  4 mg Intravenous Q8H PRN David Borjas PA-C   4 mg at 04/13/24 1429    oxybutynin tablet 5 mg  5 mg Oral TID Minda Molina MD   5 mg at 05/05/24 2025    polyethylene glycol packet 17 g  17 g Oral BID Domonique Walden MD   17 g at 05/05/24 2024    senna-docusate 8.6-50 mg per tablet 1 tablet  1 tablet Oral BID Domonique Walden MD   1 tablet at 05/05/24 2024    sodium chloride 0.9% bolus 250 mL 250 mL  250 mL Intravenous PRN Italo Segovia MD        sodium chloride 0.9% flush 10 mL  10 mL Intravenous PRN Pietro Herring MD        sodium chloride 0.9% flush 10 mL  10 mL Intravenous PRN David Borjas PA-C        sodium chloride 0.9% flush 10 mL  10 mL Intravenous PRN Carole Rolon MD        triamcinolone acetonide 0.1% cream   Topical (Top) BID Rosario Luciano MD   Given at 05/05/24 2026    white petrolatum 41 % ointment   Topical (Top) BID Johnny Cisneros MD   Given at 05/05/24 2025    zinc oxide 20 % ointment   Topical (Top) PRN David Borjas PA-C   Given at 05/05/24 0837       Objective:     Vital Signs (Most Recent):  Temp: 97.3 °F (36.3 °C) (05/06/24 0805)  Pulse: 60 (05/06/24 0805)  Resp: 20 (05/06/24 0805)  BP: 112/70 (05/06/24 0805)  SpO2: 97 % (05/06/24 0805) Vital Signs (24h Range):  Temp:  [97.3 °F (36.3 °C)-98.5 °F (36.9 °C)] 97.3 °F (36.3 °C)  Pulse:  [58-69] 60  Resp:  [17-20] 20  SpO2:  [92 %-98 %] 97 %  BP: (101-123)/(65-88) 112/70     Weight: 63 kg (138 lb 14.2 oz) (05/06/24 0555)  Body mass index is 27.27 kg/m².  Body surface area is 1.63 meters squared.    I/O last 3  completed shifts:  In: 780 [P.O.:780]  Out: 1400 [Urine:1400]     Physical Exam  Vitals and nursing note reviewed.   Constitutional:       General: She is not in acute distress.     Appearance: Normal appearance. She is well-developed. She is obese. She is ill-appearing. She is not diaphoretic.   HENT:      Head: Normocephalic and atraumatic.      Right Ear: External ear normal.      Left Ear: External ear normal.      Nose: Nose normal.      Mouth/Throat:      Mouth: Mucous membranes are moist.      Pharynx: Oropharynx is clear. No oropharyngeal exudate or posterior oropharyngeal erythema.   Eyes:      General: No scleral icterus.        Right eye: No discharge.         Left eye: No discharge.      Extraocular Movements: Extraocular movements intact.      Conjunctiva/sclera: Conjunctivae normal.   Cardiovascular:      Rate and Rhythm: Normal rate and regular rhythm.      Pulses: Normal pulses.      Heart sounds: Murmur heard.      No friction rub. No gallop.   Pulmonary:      Effort: Pulmonary effort is normal. No respiratory distress.      Breath sounds: Decreased breath sounds (throughout) and rales present. No wheezing or rhonchi.      Comments: Faint bibasilar crackles appreciated.  Abdominal:      General: Bowel sounds are normal. There is distension.      Palpations: Abdomen is soft.      Tenderness: There is no abdominal tenderness.   Genitourinary:     Comments: Jin catheter in place.  Musculoskeletal:      Cervical back: Neck supple.      Right lower le+ Pitting Edema present.      Left lower le+ Pitting Edema present.      Comments: Bilateral pitting lower extremity edema which extends proximally.    Skin:     General: Skin is warm and dry.      Coloration: Skin is not jaundiced.      Findings: Erythema, lesion and rash present.      Comments: Abrasion to left lower extremity with granulation tissue noted.    Neurological:      General: No focal deficit present.      Mental Status: She is alert  and oriented to person, place, and time. Mental status is at baseline.      Cranial Nerves: No cranial nerve deficit.      Motor: No weakness.   Psychiatric:         Mood and Affect: Mood normal.         Behavior: Behavior normal.          Significant Labs:  BMP:   Recent Labs   Lab 05/06/24  0454   GLU 80   *   K 3.7   CL 93*   CO2 22*   BUN 21*   CREATININE 3.5*   CALCIUM 8.7   MG 1.6     CBC:   Recent Labs   Lab 05/06/24  0454   WBC 3.21*   RBC 3.55*   HGB 8.7*   HCT 30.4*      MCV 86   MCH 24.5*   MCHC 28.6*     CMP:   Recent Labs   Lab 05/06/24  0454   GLU 80   CALCIUM 8.7   ALBUMIN 3.1*   *   K 3.7   CO2 22*   CL 93*   BUN 21*   CREATININE 3.5*     LFTs:   Recent Labs   Lab 05/06/24  0454   ALBUMIN 3.1*     Microbiology Results (last 7 days)       ** No results found for the last 168 hours. **          Specimen (24h ago, onward)      None          Significant Imaging:  I have reviewed all imagining in the last 24 hours.  Assessment/Plan:     Cardiac/Vascular  Acute on chronic heart failure with preserved ejection fraction (HFpEF)  Patient is identified as having Diastolic (HFpEF) heart failure that is Acute on chronic. CHF is currently uncontrolled due to Continued edema of extremities, Hepatic congestion/ascites, and JVD, >3 pillow orthopnea, Rales/crackles on pulmonary exam, and Pulmonary edema/pleural effusion on CXR.     - diuresis as detailed under Acute kidney injury superimposed on chronic kidney disease    Essential hypertension  - management per primary team    Renal/  Acute kidney injury superimposed on chronic kidney disease  CKD IV (baseline creatinine ~3.1-3.3) admitted with hypervolemia and REGINALDO with creatinine 4.2  UA showed +1 protein with UPCR of ~500 mg  Urinary sediment with non dysmorphic RBCs and WBCs present although Jin catheter placed the day prior to UA  Retroperitoneal US without evidence of hydronephrosis although changes consistent with chronic kidney  disease  Diuresis with lasix gtt @ 40mg/hr + Diuril 500mg IV BID attempted, and while Crt improved she remains unable to tolerate lying flat.  Tolerated 8h SLED w goal UF rate 200-500/hr successfully overnight 4/14-15, then 12h SLED on 4/15, 16, and 17.  4/19 - 4/24: tolerated iHD; no issues  4/22: TDC placed  4/22 - 5/3: Tolerating iHD, last session 5/1; pending outpatient HD chair.  5/3: Case escalated given difficulty procuring outpatient HD chair.    Plan/Recommendations:  - CKD IV (baseline creatinine ~3.1-3.3) admitted with hypervolemia and REGINALDO with creatinine 4.2  - UA showed +1 protein with UPCR of ~500 mg  - urinary sediment with non dysmorphic RBCs and WBCs present although Jin catheter just place yesterday   - plan for iHD today with UF of 1-2 liters as tolerated  - can continue Lasix 80 mg BID PO  - daily RFPs and magnesium levels   - please avoid hypotension/major fluctuations in BP (keep MAP > 65 mmHg)  - renal diet/tube feeds when not NPO with volume restriction per primary team  - strict I/O's and daily weights  - renally all dose medications to eGFR   - avoid nephrotoxic agents wean feasible (i.e. NSAIDs, intra-arterial contrast, supra-therapeutic vancomycin levels, etc.)  - no acute indications for RRT at this time however will continue to monitor closely       Thank you for your consult. I will follow-up with patient. Please contact us if you have any additional questions.    Italo Segovia MD  Nephrology  [facility] - Cardiology Stepdown   no
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
## 2025-05-13 NOTE — ED PROVIDER NOTE - CLINICAL SUMMARY MEDICAL DECISION MAKING FREE TEXT BOX
84-year-old male with past medical history of A-fib on Eliquis, hypertension, BPH status post right inguinal hernia repair on 5/8 complaining of inability urinate for the last 3 days. Reports gaining weight.  Denies fever, chills, chest pain, shortness of breath, abdominal pain, back pain, hematuria. Abd soft,  nontender, +distension, + ecchymosis to lower abd, incisions healing well. bedside bladder scan 891ccs. 84-year-old male with past medical history of A-fib on Eliquis, hypertension, BPH status post right inguinal hernia repair on 5/8 complaining of inability urinate for the last 3 days. Reports gaining weight.  Denies fever, chills, chest pain, shortness of breath, abdominal pain, back pain, hematuria. Abd soft,  nontender, +distension, + ecchymosis to lower abd, incisions healing well. bedside bladder scan 891ccs. will placed capps, check labs

## 2025-05-13 NOTE — ED ADULT TRIAGE NOTE - CHIEF COMPLAINT QUOTE
pt had a hernia surgery 5 days ago, now presenting with urinary retention, has not urinated in several days  sent in by surgery   no abdominal pain, fevers

## 2025-05-13 NOTE — CONSULT NOTE ADULT - ASSESSMENT
84M PMHx BPH, POD 5 s/p RA RT IHR w/ mesh, re-presents to the ED reporting urinary retention x4-5 days, labs now demonstrating Cr 10.8, K 7.8, Na 127, vitals/PE WNL. No acute surgical intervention warranted at this time, recommend further medical evaluation for electrolyte derangements, SHAKEEL.    -No acute surgical intervention  -Further medical evaluation for electrolyte/Cr correction  -Surgery Team 5B will follow, please reach out with any questions/concerns

## 2025-05-13 NOTE — CONSULT NOTE ADULT - ASSESSMENT
85 yo M w/ PMH of HTN, afib on Eliquis, BPH s/p R inguinal hernia repair on 5/8 complicated by post-procedural urinary retention now presenting with inability to void at home. In the ED found to have sCr 10.9 from prior 1.12 on 5/2. Also with K 7.8, , Na 127. Capps placed with ~2L of UOP returned reportedly. EKG without hyperkalemic changes. Received IV calcium gluconate, insulin/dextrose, lokelma. Also receiving IV fluids. Nephrology consulted for management of obstructive SHAKEEL with hyperkalemia.    Obstructive uropathy - expect rapid recovery now s/p capps  Hyperkalemia secondary to above  - K improved to 5.7 on repeat, scr improving - no urgent indication for HD  - Continue lokelma 10g q4h until K consistently <5  - If patient develops hyperkalemic EKG changes can repeat Ca gluconate  - Repeat BMP at 8-10PM to follow K trend, if K rising and BP stable can give second dose of Lasix  - Potassium restricted diet  - Maintain capps, strict I&O  - May develop post-obstructive diuresis, can continue IV fluids as tolerated and adjust based on UOP and hemodynamics 85 yo M w/ PMH of HTN, afib on Eliquis, BPH s/p R inguinal hernia repair on 5/8 complicated by post-procedural urinary retention now presenting with inability to void at home. In the ED found to have sCr 10.9 from prior 1.12 on 5/2. Also with K 7.8, , Na 127. Capps placed with ~2L of UOP returned reportedly. EKG without hyperkalemic changes. Received IV calcium gluconate, insulin/dextrose, lokelma and lasix IV. Also receiving IV fluids. Nephrology consulted for management of obstructive SHAKEEL with hyperkalemia.    Obstructive uropathy - expect rapid recovery now s/p capps  Hyperkalemia secondary to above  - K improved to 5.7 on repeat, scr improving - no urgent indication for HD  - Continue lokelma 10g q4h until K consistently <5  - If patient develops hyperkalemic EKG changes can repeat Ca gluconate  - Repeat BMP at 8-10PM to follow K trend, if K rising and BP stable can give second dose of Lasix  - Potassium restricted diet  - Maintain capps, strict I&O  - May develop post-obstructive diuresis, can continue IV fluids as tolerated and adjust based on UOP and hemodynamics  - consult    Discussed with primary team

## 2025-05-13 NOTE — ED PROVIDER NOTE - OBJECTIVE STATEMENT
84-year-old male with past medical history of A-fib on Eliquis, hypertension, BPH status post right inguinal hernia repair on 5/8 complaining of inability urinate for the last 3 days.  Patient states he had to be catheterized after surgery and was told to straight cath at home if he was unable to urinate.  Patient states the day after surgery he urinated a little bit so he thought he was fine.  Since then has not had any urine.  Reports gaining weight.  Denies fever, chills, chest pain, shortness of breath, abdominal pain, back pain, hematuria.  Patient spoke to his urologist who advised him to come to the emergency department.

## 2025-05-13 NOTE — H&P ADULT - HISTORY OF PRESENT ILLNESS
Mr. Esquivel is an 84-year-old male with past medical history of A-fib on Eliquis, hypertension, BPH who underwent right inguinal hernia repair on 5/8 with post procedural urinary retention. He reports that he has not urinated until the day after his intervention, was planned to perform straight cath which was not performed at home. He reports that he would have urges to urinate and after attempting felt the urge pass, he is unsure of whether he ever actually produced urine through this time. He reports that he has had normal bowel movements, last was this morning.     His main symptom is that of decreased appetite leading to evaluation in the ED, He denies nausea, vomiting, hiccups, skin lesions, fevers, chills, chest pain, syncope.     In the ED the patient was afebrile, HR of 55, BP of 149/73, 97% on RA. Labs show leukocytosis to 11.32, Hgb of 11.3 from 11.8 pre procedural, normal platelets, hyponatremia to 127, K of 7.8, Cl of 93, Bicarb of 17, AG of 17, BUN of 106, Cr of 10.8, urinalysis now with blood.   He had a capps placed, Received Calcium, Insulin, Albuterol, Lokelma.

## 2025-05-14 LAB
ADD ON TEST-SPECIMEN IN LAB: SIGNIFICANT CHANGE UP
ADD ON TEST-SPECIMEN IN LAB: SIGNIFICANT CHANGE UP
ANION GAP SERPL CALC-SCNC: 10 MMOL/L — SIGNIFICANT CHANGE UP (ref 5–17)
ANION GAP SERPL CALC-SCNC: 12 MMOL/L — SIGNIFICANT CHANGE UP (ref 5–17)
ANION GAP SERPL CALC-SCNC: 12 MMOL/L — SIGNIFICANT CHANGE UP (ref 5–17)
ANION GAP SERPL CALC-SCNC: 13 MMOL/L — SIGNIFICANT CHANGE UP (ref 5–17)
ANION GAP SERPL CALC-SCNC: 17 MMOL/L — SIGNIFICANT CHANGE UP (ref 5–17)
BASOPHILS # BLD AUTO: 0.02 K/UL — SIGNIFICANT CHANGE UP (ref 0–0.2)
BASOPHILS NFR BLD AUTO: 0.3 % — SIGNIFICANT CHANGE UP (ref 0–2)
BLD GP AB SCN SERPL QL: NEGATIVE — SIGNIFICANT CHANGE UP
BUN SERPL-MCNC: 48 MG/DL — HIGH (ref 7–23)
BUN SERPL-MCNC: 55 MG/DL — HIGH (ref 7–23)
BUN SERPL-MCNC: 63 MG/DL — HIGH (ref 7–23)
BUN SERPL-MCNC: 77 MG/DL — HIGH (ref 7–23)
BUN SERPL-MCNC: 83 MG/DL — HIGH (ref 7–23)
CALCIUM SERPL-MCNC: 7.9 MG/DL — LOW (ref 8.4–10.5)
CALCIUM SERPL-MCNC: 8 MG/DL — LOW (ref 8.4–10.5)
CALCIUM SERPL-MCNC: 8.2 MG/DL — LOW (ref 8.4–10.5)
CALCIUM SERPL-MCNC: 8.4 MG/DL — SIGNIFICANT CHANGE UP (ref 8.4–10.5)
CALCIUM SERPL-MCNC: 8.4 MG/DL — SIGNIFICANT CHANGE UP (ref 8.4–10.5)
CHLORIDE SERPL-SCNC: 103 MMOL/L — SIGNIFICANT CHANGE UP (ref 96–108)
CHLORIDE SERPL-SCNC: 106 MMOL/L — SIGNIFICANT CHANGE UP (ref 96–108)
CHLORIDE SERPL-SCNC: 109 MMOL/L — HIGH (ref 96–108)
CHLORIDE SERPL-SCNC: 110 MMOL/L — HIGH (ref 96–108)
CHLORIDE SERPL-SCNC: 110 MMOL/L — HIGH (ref 96–108)
CO2 SERPL-SCNC: 20 MMOL/L — LOW (ref 22–31)
CO2 SERPL-SCNC: 22 MMOL/L — SIGNIFICANT CHANGE UP (ref 22–31)
CO2 SERPL-SCNC: 22 MMOL/L — SIGNIFICANT CHANGE UP (ref 22–31)
CO2 SERPL-SCNC: 23 MMOL/L — SIGNIFICANT CHANGE UP (ref 22–31)
CO2 SERPL-SCNC: 23 MMOL/L — SIGNIFICANT CHANGE UP (ref 22–31)
CREAT SERPL-MCNC: 2.32 MG/DL — HIGH (ref 0.5–1.3)
CREAT SERPL-MCNC: 2.8 MG/DL — HIGH (ref 0.5–1.3)
CREAT SERPL-MCNC: 3.48 MG/DL — HIGH (ref 0.5–1.3)
CREAT SERPL-MCNC: 4.61 MG/DL — HIGH (ref 0.5–1.3)
CREAT SERPL-MCNC: 6.13 MG/DL — HIGH (ref 0.5–1.3)
CULTURE RESULTS: NO GROWTH — SIGNIFICANT CHANGE UP
EGFR: 12 ML/MIN/1.73M2 — LOW
EGFR: 12 ML/MIN/1.73M2 — LOW
EGFR: 17 ML/MIN/1.73M2 — LOW
EGFR: 17 ML/MIN/1.73M2 — LOW
EGFR: 21 ML/MIN/1.73M2 — LOW
EGFR: 21 ML/MIN/1.73M2 — LOW
EGFR: 27 ML/MIN/1.73M2 — LOW
EGFR: 27 ML/MIN/1.73M2 — LOW
EGFR: 8 ML/MIN/1.73M2 — LOW
EGFR: 8 ML/MIN/1.73M2 — LOW
EOSINOPHIL # BLD AUTO: 0.11 K/UL — SIGNIFICANT CHANGE UP (ref 0–0.5)
EOSINOPHIL NFR BLD AUTO: 1.7 % — SIGNIFICANT CHANGE UP (ref 0–6)
GLUCOSE SERPL-MCNC: 105 MG/DL — HIGH (ref 70–99)
GLUCOSE SERPL-MCNC: 110 MG/DL — HIGH (ref 70–99)
GLUCOSE SERPL-MCNC: 137 MG/DL — HIGH (ref 70–99)
GLUCOSE SERPL-MCNC: 164 MG/DL — HIGH (ref 70–99)
GLUCOSE SERPL-MCNC: 91 MG/DL — SIGNIFICANT CHANGE UP (ref 70–99)
HCT VFR BLD CALC: 35.2 % — LOW (ref 39–50)
HGB BLD-MCNC: 11.9 G/DL — LOW (ref 13–17)
IMM GRANULOCYTES NFR BLD AUTO: 0.2 % — SIGNIFICANT CHANGE UP (ref 0–0.9)
LYMPHOCYTES # BLD AUTO: 1.37 K/UL — SIGNIFICANT CHANGE UP (ref 1–3.3)
LYMPHOCYTES # BLD AUTO: 21.6 % — SIGNIFICANT CHANGE UP (ref 13–44)
MAGNESIUM SERPL-MCNC: 2.2 MG/DL — SIGNIFICANT CHANGE UP (ref 1.6–2.6)
MCHC RBC-ENTMCNC: 28 PG — SIGNIFICANT CHANGE UP (ref 27–34)
MCHC RBC-ENTMCNC: 33.8 G/DL — SIGNIFICANT CHANGE UP (ref 32–36)
MCV RBC AUTO: 82.8 FL — SIGNIFICANT CHANGE UP (ref 80–100)
MONOCYTES # BLD AUTO: 0.75 K/UL — SIGNIFICANT CHANGE UP (ref 0–0.9)
MONOCYTES NFR BLD AUTO: 11.8 % — SIGNIFICANT CHANGE UP (ref 2–14)
NEUTROPHILS # BLD AUTO: 4.09 K/UL — SIGNIFICANT CHANGE UP (ref 1.8–7.4)
NEUTROPHILS NFR BLD AUTO: 64.4 % — SIGNIFICANT CHANGE UP (ref 43–77)
NRBC BLD AUTO-RTO: 0 /100 WBCS — SIGNIFICANT CHANGE UP (ref 0–0)
PHOSPHATE SERPL-MCNC: 4.2 MG/DL — SIGNIFICANT CHANGE UP (ref 2.5–4.5)
PLATELET # BLD AUTO: 251 K/UL — SIGNIFICANT CHANGE UP (ref 150–400)
POTASSIUM SERPL-MCNC: 4.2 MMOL/L — SIGNIFICANT CHANGE UP (ref 3.5–5.3)
POTASSIUM SERPL-MCNC: 4.5 MMOL/L — SIGNIFICANT CHANGE UP (ref 3.5–5.3)
POTASSIUM SERPL-MCNC: 4.8 MMOL/L — SIGNIFICANT CHANGE UP (ref 3.5–5.3)
POTASSIUM SERPL-SCNC: 4.2 MMOL/L — SIGNIFICANT CHANGE UP (ref 3.5–5.3)
POTASSIUM SERPL-SCNC: 4.5 MMOL/L — SIGNIFICANT CHANGE UP (ref 3.5–5.3)
POTASSIUM SERPL-SCNC: 4.8 MMOL/L — SIGNIFICANT CHANGE UP (ref 3.5–5.3)
RBC # BLD: 4.25 M/UL — SIGNIFICANT CHANGE UP (ref 4.2–5.8)
RBC # FLD: 13.1 % — SIGNIFICANT CHANGE UP (ref 10.3–14.5)
RH IG SCN BLD-IMP: POSITIVE — SIGNIFICANT CHANGE UP
SODIUM SERPL-SCNC: 138 MMOL/L — SIGNIFICANT CHANGE UP (ref 135–145)
SODIUM SERPL-SCNC: 142 MMOL/L — SIGNIFICANT CHANGE UP (ref 135–145)
SODIUM SERPL-SCNC: 143 MMOL/L — SIGNIFICANT CHANGE UP (ref 135–145)
SODIUM SERPL-SCNC: 144 MMOL/L — SIGNIFICANT CHANGE UP (ref 135–145)
SODIUM SERPL-SCNC: 145 MMOL/L — SIGNIFICANT CHANGE UP (ref 135–145)
SPECIMEN SOURCE: SIGNIFICANT CHANGE UP
WBC # BLD: 6.35 K/UL — SIGNIFICANT CHANGE UP (ref 3.8–10.5)
WBC # FLD AUTO: 6.35 K/UL — SIGNIFICANT CHANGE UP (ref 3.8–10.5)

## 2025-05-14 PROCEDURE — 99221 1ST HOSP IP/OBS SF/LOW 40: CPT

## 2025-05-14 PROCEDURE — 99232 SBSQ HOSP IP/OBS MODERATE 35: CPT

## 2025-05-14 PROCEDURE — 93010 ELECTROCARDIOGRAM REPORT: CPT

## 2025-05-14 PROCEDURE — ZZZZZ: CPT

## 2025-05-14 RX ORDER — DILTIAZEM HYDROCHLORIDE 120 MG/1
60 CAPSULE, EXTENDED RELEASE ORAL EVERY 12 HOURS
Refills: 0 | Status: DISCONTINUED | OUTPATIENT
Start: 2025-05-14 | End: 2025-05-14

## 2025-05-14 RX ORDER — SODIUM CHLORIDE 9 G/1000ML
1000 INJECTION, SOLUTION INTRAVENOUS
Refills: 0 | Status: DISCONTINUED | OUTPATIENT
Start: 2025-05-14 | End: 2025-05-15

## 2025-05-14 RX ORDER — METOPROLOL SUCCINATE 50 MG/1
5 TABLET, EXTENDED RELEASE ORAL ONCE
Refills: 0 | Status: COMPLETED | OUTPATIENT
Start: 2025-05-14 | End: 2025-05-14

## 2025-05-14 RX ORDER — APIXABAN 2.5 MG/1
2.5 TABLET, FILM COATED ORAL EVERY 12 HOURS
Refills: 0 | Status: DISCONTINUED | OUTPATIENT
Start: 2025-05-14 | End: 2025-05-15

## 2025-05-14 RX ORDER — METOPROLOL SUCCINATE 50 MG/1
25 TABLET, EXTENDED RELEASE ORAL EVERY 6 HOURS
Refills: 0 | Status: DISCONTINUED | OUTPATIENT
Start: 2025-05-14 | End: 2025-05-15

## 2025-05-14 RX ORDER — METOPROLOL SUCCINATE 50 MG/1
50 TABLET, EXTENDED RELEASE ORAL EVERY 12 HOURS
Refills: 0 | Status: DISCONTINUED | OUTPATIENT
Start: 2025-05-14 | End: 2025-05-14

## 2025-05-14 RX ORDER — SILODOSIN 4 MG/1
8 CAPSULE ORAL AT BEDTIME
Refills: 0 | Status: DISCONTINUED | OUTPATIENT
Start: 2025-05-14 | End: 2025-05-15

## 2025-05-14 RX ORDER — POLYETHYLENE GLYCOL 3350 17 G/17G
17 POWDER, FOR SOLUTION ORAL EVERY 24 HOURS
Refills: 0 | Status: DISCONTINUED | OUTPATIENT
Start: 2025-05-14 | End: 2025-05-15

## 2025-05-14 RX ORDER — SENNA 187 MG
2 TABLET ORAL AT BEDTIME
Refills: 0 | Status: DISCONTINUED | OUTPATIENT
Start: 2025-05-14 | End: 2025-05-15

## 2025-05-14 RX ORDER — FINASTERIDE 1 MG/1
5 TABLET, FILM COATED ORAL DAILY
Refills: 0 | Status: DISCONTINUED | OUTPATIENT
Start: 2025-05-14 | End: 2025-05-15

## 2025-05-14 RX ORDER — DILTIAZEM HYDROCHLORIDE 120 MG/1
20 CAPSULE, EXTENDED RELEASE ORAL ONCE
Refills: 0 | Status: COMPLETED | OUTPATIENT
Start: 2025-05-14 | End: 2025-05-14

## 2025-05-14 RX ADMIN — METOPROLOL SUCCINATE 25 MILLIGRAM(S): 50 TABLET, EXTENDED RELEASE ORAL at 11:03

## 2025-05-14 RX ADMIN — METOPROLOL SUCCINATE 50 MILLIGRAM(S): 50 TABLET, EXTENDED RELEASE ORAL at 04:26

## 2025-05-14 RX ADMIN — FINASTERIDE 5 MILLIGRAM(S): 1 TABLET, FILM COATED ORAL at 21:48

## 2025-05-14 RX ADMIN — SILODOSIN 8 MILLIGRAM(S): 4 CAPSULE ORAL at 21:47

## 2025-05-14 RX ADMIN — APIXABAN 2.5 MILLIGRAM(S): 2.5 TABLET, FILM COATED ORAL at 17:06

## 2025-05-14 RX ADMIN — METOPROLOL SUCCINATE 5 MILLIGRAM(S): 50 TABLET, EXTENDED RELEASE ORAL at 10:14

## 2025-05-14 RX ADMIN — SODIUM ZIRCONIUM CYCLOSILICATE 10 GRAM(S): 5 POWDER, FOR SUSPENSION ORAL at 01:37

## 2025-05-14 RX ADMIN — Medication 100 MILLIGRAM(S): at 11:02

## 2025-05-14 RX ADMIN — DILTIAZEM HYDROCHLORIDE 20 MILLIGRAM(S): 120 CAPSULE, EXTENDED RELEASE ORAL at 05:11

## 2025-05-14 RX ADMIN — POLYETHYLENE GLYCOL 3350 17 GRAM(S): 17 POWDER, FOR SOLUTION ORAL at 09:40

## 2025-05-14 RX ADMIN — METOPROLOL SUCCINATE 5 MILLIGRAM(S): 50 TABLET, EXTENDED RELEASE ORAL at 04:25

## 2025-05-14 RX ADMIN — APIXABAN 5 MILLIGRAM(S): 2.5 TABLET, FILM COATED ORAL at 05:39

## 2025-05-14 RX ADMIN — Medication 2 TABLET(S): at 21:49

## 2025-05-14 RX ADMIN — METOPROLOL SUCCINATE 25 MILLIGRAM(S): 50 TABLET, EXTENDED RELEASE ORAL at 17:06

## 2025-05-14 RX ADMIN — Medication 300 MILLILITER(S): at 03:16

## 2025-05-14 RX ADMIN — METOPROLOL SUCCINATE 5 MILLIGRAM(S): 50 TABLET, EXTENDED RELEASE ORAL at 03:55

## 2025-05-14 RX ADMIN — Medication 200 MILLILITER(S): at 06:53

## 2025-05-14 RX ADMIN — SODIUM CHLORIDE 100 MILLILITER(S): 9 INJECTION, SOLUTION INTRAVENOUS at 10:43

## 2025-05-14 RX ADMIN — SODIUM CHLORIDE 100 MILLILITER(S): 9 INJECTION, SOLUTION INTRAVENOUS at 23:42

## 2025-05-14 RX ADMIN — DILTIAZEM HYDROCHLORIDE 60 MILLIGRAM(S): 120 CAPSULE, EXTENDED RELEASE ORAL at 05:38

## 2025-05-14 NOTE — PROGRESS NOTE ADULT - ASSESSMENT
85 yo M admitted for SHAKEEL thought to be secondary to urinary retention/obstructive uropathy. SCr on admission around 10 ranges c/b hyperkalemia 7.0 s/p capps cath insertion with improvement on all renal markers      1. Non oliguric SHAKEEL 2/2 Obstructive uropathy/urinary retention - improving  Maintain capps cath  Avoid nephrotoxins as much as possible  Since he is having post obstructive diuresis with approx 11L of UOP, agree w/ IVF.   C/w NaCl 0.45% at 200ml/hr   If K+ < 3.5 can add KCl 20meq to NaCl.   Monitor BMP for electrolyte monitoring   Consider  consult      83 yo M admitted for SHAKEEL thought to be secondary to urinary retention/obstructive uropathy. SCr on admission at 10.89 ranges c/b hyperkalemia 7.8 s/p capps cath insertion with improvement on all renal markers      1. Non oliguric SHAKEEL 2/2 Obstructive uropathy/urinary retention - improving  Maintain capps cath  Avoid nephrotoxins as much as possible  Since he is having post obstructive diuresis with approx 11L of UOP~ agree w/ IVF (goal is to replete 2/3 of UOP)   C/w NaCl 0.45% at 200ml/hr   If K+ < 3.5 can add KCl 20meq to NaCl.   Monitor BMP for electrolyte monitoring   Consider  consult   Expect renal recovery     Discussed with primary team

## 2025-05-14 NOTE — PROGRESS NOTE ADULT - SUBJECTIVE AND OBJECTIVE BOX
STATUS POST:  POD6 RA RT inguinal hernia repair with mesh    SUBJECTIVE: Patient seen and examined on AM rounds. Patient has no acute complaints at this time. Pt tolerating diet and pain is well controlled on current regimen. Endorses passing consistent flatus however hasn't had a bowel movement in 2 days. Denies n/v/f/c/sob/cp.     MEDICATIONS  (STANDING):  allopurinol 100 milliGRAM(s) Oral daily  apixaban 5 milliGRAM(s) Oral every 12 hours  diltiazem    Tablet 60 milliGRAM(s) Oral every 12 hours  metoprolol tartrate 50 milliGRAM(s) Oral every 12 hours  polyethylene glycol 3350 17 Gram(s) Oral every 24 hours  senna 2 Tablet(s) Oral at bedtime  sodium chloride 0.9%. 1000 milliLiter(s) (200 mL/Hr) IV Continuous <Continuous>    MEDICATIONS  (PRN):  acetaminophen     Tablet .. 650 milliGRAM(s) Oral every 6 hours PRN Temp greater or equal to 38C (100.4F), Mild Pain (1 - 3)  melatonin 3 milliGRAM(s) Oral at bedtime PRN Insomnia      Vital Signs Last 24 Hrs  T(C): 36.9 (14 May 2025 07:12), Max: 37.2 (13 May 2025 21:16)  T(F): 98.5 (14 May 2025 07:12), Max: 98.9 (13 May 2025 21:16)  HR: 82 (14 May 2025 08:56) (51 - 144)  BP: 105/54 (14 May 2025 08:56) (75/50 - 149/73)  BP(mean): 76 (14 May 2025 08:56) (59 - 93)  RR: 19 (14 May 2025 08:56) (16 - 19)  SpO2: 92% (14 May 2025 08:56) (90% - 100%)    Parameters below as of 14 May 2025 08:56  Patient On (Oxygen Delivery Method): room air        PHYSICAL EXAM:  Constitutional: A&Ox3  Respiratory: non labored breathing, no respiratory distress  Cardiovascular: NSR, RRR  Gastrointestinal: Abd softly distended (-) rebound or guarding. Ecchymosis @ lower abdomen near incision site. Incisions c/d/i  Genitourinary: capps draining clear yellow urine  Extremities: (-) edema, WW        I&O's Detail    13 May 2025 07:01  -  14 May 2025 07:00  --------------------------------------------------------  IN:    sodium chloride 0.9%: 400 mL    sodium chloride 0.9%: 300 mL    sodium chloride 0.9%: 1400 mL    sodium chloride 0.9% w/ Additives: 900 mL  Total IN: 3000 mL    OUT:    Indwelling Catheter - Urethral (mL): 8725 mL    Voided (mL): 3800 mL  Total OUT: 16974 mL    Total NET: -9525 mL      14 May 2025 07:01  -  14 May 2025 09:39  --------------------------------------------------------  IN:    sodium chloride 0.9%: 400 mL  Total IN: 400 mL    OUT:    Indwelling Catheter - Urethral (mL): 225 mL  Total OUT: 225 mL    Total NET: 175 mL          LABS:                        11.9   6.35  )-----------( 251      ( 14 May 2025 04:35 )             35.2     05-14    143  |  106  |  77[H]  ----------------------------<  91  4.5   |  20[L]  |  4.61[H]    Ca    8.4      14 May 2025 04:35  Phos  4.2     05-14  Mg     2.2     05-14    TPro  6.1  /  Alb  3.2[L]  /  TBili  0.4  /  DBili  x   /  AST  13  /  ALT  7[L]  /  AlkPhos  83  05-13      Urinalysis Basic - ( 14 May 2025 04:35 )    Color: x / Appearance: x / SG: x / pH: x  Gluc: 91 mg/dL / Ketone: x  / Bili: x / Urobili: x   Blood: x / Protein: x / Nitrite: x   Leuk Esterase: x / RBC: x / WBC x   Sq Epi: x / Non Sq Epi: x / Bacteria: x        RADIOLOGY & ADDITIONAL STUDIES:

## 2025-05-14 NOTE — PROGRESS NOTE ADULT - ASSESSMENT
84M PMHx BPH, POD 6 s/p RA RT IHR w/ mesh, re-presents to the ED reporting urinary retention x4-5 days, labs now demonstrating Cr 10.8, K 7.8, Na 127, vitals/PE WNL. No acute surgical intervention warranted at this time, recommend further medical evaluation for electrolyte derangements, SHAKEEL. Electrolytes normalizing and Cr downtrending at 4.61 (6.13).    -No acute surgical intervention  -Further medical evaluation for electrolyte/Cr correction  -Surgery Team 5B will follow, please reach out with any questions/concerns

## 2025-05-14 NOTE — PROGRESS NOTE ADULT - SUBJECTIVE AND OBJECTIVE BOX
INTERVAL/OVERNIGHT EVENTS: Documented 4700ml in ~1hr, unclear if accurate. 8pm Cr improved but K increased to 6, given lokelma 10. bicarb deficit 260meq, added 150meq of bicarb to IVF and increased rate to 300ml/hr. MN UOP slowing down to ~500cc/hr. 12pm BMP 4.8. 3:30am went into afib w/ RVR; give IV Lopressor 5mg still in RVR, gave additl 5mg IV and and home PO lopressor 50mg. Ordered 20mg of diltiazem IV. Started on cardizem 60mg q12. POCUS showing euvolemia. TSH elevated 5, added on free t4. UOP decreasing - decreased IVF to 200cc    SUBJECTIVE: Patient seen and examined at bedside, comfortable, NAD. He feels well overall, no acute concerns or complaints. Denied fever, chest pain, dyspnea, abdominal pain. Has hx of urinary retention in the past.     Vital Signs Last 12 Hrs  T(F): 98.4 (05-14-25 @ 14:24), Max: 98.5 (05-14-25 @ 07:12)  HR: 120 (05-14-25 @ 12:32) (82 - 150)  BP: 112/73 (05-14-25 @ 12:32) (75/50 - 122/51)  BP(mean): 84 (05-14-25 @ 12:32) (59 - 84)  RR: 18 (05-14-25 @ 12:32) (18 - 19)  SpO2: 93% (05-14-25 @ 12:32) (90% - 95%)  I&O's Summary    13 May 2025 07:01  -  14 May 2025 07:00  --------------------------------------------------------  IN: 3000 mL / OUT: 30005 mL / NET: -9525 mL    14 May 2025 07:01  -  14 May 2025 15:05  --------------------------------------------------------  IN: 1000 mL / OUT: 900 mL / NET: 100 mL    PHYSICAL EXAM:  General: NAD  HEENT: PERRL, EOM intact, sclera anicteric, MMM  Cardiovascular: irregularly irregular; no MRG;   Respiratory: CTAB; no WRR  GI/: soft; NTND; BS x4  Extremities: WWP; 2+ peripheral pulses bilaterally; no LE edema  Skin: normal color & turgor; no rash  Neurologic: aox3; no focal deficits    LABS:                        11.9   6.35  )-----------( 251      ( 14 May 2025 04:35 )             35.2     05-14    142  |  109[H]  |  63[H]  ----------------------------<  137[H]  4.2   |  23  |  3.48[H]    Ca    7.9[L]      14 May 2025 10:00  Phos  4.2     05-14  Mg     2.2     05-14    TPro  6.1  /  Alb  3.2[L]  /  TBili  0.4  /  DBili  x   /  AST  13  /  ALT  7[L]  /  AlkPhos  83  05-13      Urinalysis Basic - ( 14 May 2025 10:00 )    Color: x / Appearance: x / SG: x / pH: x  Gluc: 137 mg/dL / Ketone: x  / Bili: x / Urobili: x   Blood: x / Protein: x / Nitrite: x   Leuk Esterase: x / RBC: x / WBC x   Sq Epi: x / Non Sq Epi: x / Bacteria: x          RADIOLOGY & ADDITIONAL TESTS:    MEDICATIONS  (STANDING):  allopurinol 100 milliGRAM(s) Oral daily  apixaban 2.5 milliGRAM(s) Oral every 12 hours  finasteride 5 milliGRAM(s) Oral daily  metoprolol tartrate 25 milliGRAM(s) Oral every 6 hours  polyethylene glycol 3350 17 Gram(s) Oral every 24 hours  senna 2 Tablet(s) Oral at bedtime  silodosin 8 milliGRAM(s) Oral at bedtime  sodium chloride 0.45%. 1000 milliLiter(s) (100 mL/Hr) IV Continuous <Continuous>    MEDICATIONS  (PRN):  acetaminophen     Tablet .. 650 milliGRAM(s) Oral every 6 hours PRN Temp greater or equal to 38C (100.4F), Mild Pain (1 - 3)  melatonin 3 milliGRAM(s) Oral at bedtime PRN Insomnia

## 2025-05-14 NOTE — CONSULT NOTE ADULT - ATTENDING COMMENTS
Breathing is unlabored without accessory muscle use, normal breath sounds
Consulted for SHAKEEL sec to obstructive uropathy/urinary retention c/b hyperkalemia  Hyperkalemia improved after receiving hyperk coctail and capps insertion  Agree w/ fellow note  VS, Labs and EMR reviewed    Further recs as above  Discussed with primary team
Patient is an 83 yo Male with Apical HOCM, Aflutter on Eliquis, HTN, BPH, gout who underwent right inguinal hernia repair on 5/8 with post procedural urinary retention leading to acute renal failure, hyperkalemia, and post-obstructive diuresis, admitted to Tele for monitoring of post-obstructive diuresis and management of Aflutter with RVR    Review of Studies:  - NST 04/25/2025: Normal myocardial perfusion scan, with no evidence of infarction or inducible ischemia.The patient underwent stress testing using the standard [facility] protocol.• The patient exercised for 4 min.• The test was stopped due to fatigue.• The peak heart rate was 135 bpm; 100 % of predicted maximal heart rate for this patient.• The patient achieved 5.5 METS.3.Baseline electrocardiogram: normal sinus rhythm at a rate of 64 bpm with no arrhythmias and nonspecific ST-T wave abnormalities.4.Stress electrocardiogram: No ischemic ST segment changes.5.Arrhythmias: No arrhythmia associated with stress.6.Normal left ventricular regional wall motion.7.The left ventricle is normal in function  - TTE 4/1/2025: Left ventricular systolic function is normal with an ejection fraction visually estimated at 55 to 60 %. Mild mitral regurgitation. .Mild tricuspid regurgitation. 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 .Mild aortic regurgitation.6.Tricuspid aortic valve with normal leaflet excursion    # Parodixcal Atrial Flutter  # Aflutter with RVR  # Apical HOCM    - Patient has Apical HOCM and underwent ivana-Operative CV testing via NST. Myocardial Perfusion scan was normal and patient underwent subsequent right inguinal hernia repair on 5/8. Eliquis was held on 5/2 and resumed post operatively  - Tele reviewed. Patient originally admitted in NSR and overnight went into Aflutter with RVR. Tele reviewed showing Aflutter with RVR and intermittent spontaneous conversion to NSR starting 3 am.   - Patient has normal BIV function and known Apical HOCM without valvular heart disease  - At this time to rate control, would discontinue diltiazem started by primary team  - Would place on Lopressor 25 mg po Q/6 ( Home Lopressor 50 mg po BID) and if Aflutter persist with RVR, as patient is intermittently cardioverting on his own, would initiate Amiodarone 400 mg po BID to help maintain NSR  - Do not anticipate patient will be discharge on Amiodarone  - Given Apical HOCM with flutter patient should be maintain on AC indefinitely. Continue with Eliquis 2.5 mg po BID ( Age 84; Cr:3.48)  - Patient HCM is Apical variant with no known LVOT gradient. However would maintain euvolemia and avoid Hypovolemic state  - Cardiology will continue to follow with you, please call with any questions

## 2025-05-14 NOTE — PROGRESS NOTE ADULT - ASSESSMENT
84-year-old male with past medical history of A-fib on Eliquis, hypertension, BPH, gout who underwent right inguinal hernia repair on 5/8 with post procedural urinary retention leading to acute renal failure, hyperkalemia, and post-obstructive diuresis, admitted to [facility] for monitoring of post-ob diuresis and afib RVR.     Neuro:   JERONIMO    Cardiac:   #Atrial Fibrillation: on home meds Toprol 25 daily and Eliquis 5 g PO BID.   pt stopped taking eliquis for 5d prior to procedure on 5/8 5/13 overnight: pt with afib rvr, given lopressor 5mg IVP x2, and started cardizem PO q12h  Plan:    stop cardizem given no known EF    cardiology consulted, recs appreciated    increase to Lopressor 25mg q6h per cards    formal TTE    decrease eliquis 5mg BID to 2.5mg BID given SHAKEEL    #Hypertension: Not currently on antihypertensives outside of BPH medications and Toprol. Continue to monitor.     Pulmonary:   JERONIMO    Gastroenterology:   bowel regimen: miralax and senna    Nephrology:   # Acute renal Failure secondary to obstructive uropathy with chronic BPH c/b hyperkalemia: Patient with chronic BPH with post procedural urinary retention on 5/8, was meant to straight cath at home  but did not as if he was able to urinate in the last 5 days. He presents with 1500ccs on bladder scan, s/p capps placement with 2L output. FE urea/FENA with intrinsic disease, likely in the setting of severe obstruction.     - Nephrology consulted, appreciate recs.   - Strict hourly urine outputs, attempt to replete 1/2 of hourly urine output with NS or LR.   - C/w NaCl 0.45% at 100ml/hr   - If K+ < 3.5 can add KCl 20meq to NaCl  - f/u 2pm BMP for electrolyte monitoring    # Hyperkalemia: From acute renal failure no EKG changes noted.  - Management with Calcium Gluconate 2 grams, Insulin 5 units IVP, Albuterol inhaler, Lasix, per Nephrology             lokelma 10mg x1 given  - K wnl at this time  - Trend potassium with BMP    # Hyponatremia: Na of 127, patient with elevated total body water given renal failure vs SIADH from urinary obstruction. Interestingly serum osmolarity elevated at 310 though likely with increased BUN, urine osm low at 208 though this is after obstruction was relieved.  Patient asymptomatic. Lasix administered in the ED.   RESOLVED    # Metabolic Acidosis: Patient with HAGMA, likely in the setting of uremia as above. D/D of 0.78 so possible mixed picture, though at this time no symptoms to explain. Will likely resolve with correction of uremia.   - Continue to monitor.     # Gout: Hold home allopurinol 300 mg daily, AM uric acid testing.     Urology:   # BPH: Given Acute renal failure hold Finasteride 5mg PO daily and Silodosin 8.   - restart home meds per outpatient urology    Heme:   # Anemia: Normocytic anemia at 11.3, down from 11.8 pre-surgery.   - Follow up routine cancer screening history.     F: As above.   E: Replete as needed  N: Renal diet.   DVT ppx: eliquis  Dispo: [facility]

## 2025-05-14 NOTE — CONSULT NOTE ADULT - ASSESSMENT
84M PMH apical HCM, atrial flutter on eliquis (held since 5/2), HTN who  underwent right inguinal hernia repair on 5/8 with post procedural urinary retention, now with post-obstructive autodiuresis. Course c/b atrial flutter with RVR.     REVIEW OF STUDIES  NST 04/25/2025: Normal myocardial perfusion scan, with no evidence of infarction or inducible ischemia.The patient underwent stress testing using the standard [facility] protocol.• The patient exercised for 4 min.• The test was stopped due to fatigue.• The peak heart rate was 135 bpm; 100 % of predicted maximal heart rate for this patient.• The patient achieved 5.5 METS.3.Baseline electrocardiogram: normal sinus rhythm at a rate of 64 bpm with no arrhythmias and nonspecific ST-T wave abnormalities.4.Stress electrocardiogram: No ischemic ST segment changes.5.Arrhythmias: No arrhythmia associated with stress.6.Normal left ventricular regional wall motion.7.The left ventricle is normal in function    TTE 4/1/2025: Left ventricular systolic function is normal with an ejection fraction visually estimated at 55 to 60 %. Mild mitral regurgitation. .Mild tricuspid regurgitation. 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 .Mild aortic regurgitation.6.Tricuspid aortic valve with normal leaflet excursion    #Atrial flutter  -Likely in setting of post-op state, volume depletion from large volume urine output  -Tele reveals periods of atrial flutter with RVR with sponteneous conversions to NSR  -Start lopressor 25mg q6h (home 50mg BID)  -If flutter with RVR persists, start amiodarone 400mg BID to maintain NSR  -Continue indefinite AC given apical HCM  -Match volume losses as per nephrology recs

## 2025-05-14 NOTE — PROGRESS NOTE ADULT - SUBJECTIVE AND OBJECTIVE BOX
[facility] DIVISION OF KIDNEY DISEASES AND HYPERTENSION -- FOLLOW UP NOTE  --------------------------------------------------------------------------------  Chief Complaint:    24 hour events/subjective:      REVIEW OF SYSTEMS  --------------------------------------------------------------------------------  Gen: No fevers/chills, weakness  Skin: No rashes  Head/Eyes/Ears/Mouth: No headache; No hearing changes, mucous discharge, vision changes  Respiratory: No dyspnea, cough, wheezing  CV: No chest pain, palpitations  GI: No abdominal pain, diarrhea, constipation, nausea, vomiting, melena, hematochezia  : No increased frequency, dysuria, hematuria  MSK: No joint pain/swelling; no back pain; no edema  Neuro: No dizziness/lightheadedness, weakness, seizures, numbness  Heme: No easy bruising or bleeding    All other systems were reviewed and are negative, except as noted.    PAST HISTORY  --------------------------------------------------------------------------------  No significant changes to PMH, PSH, FHx, SHx, unless otherwise noted    ALLERGIES & MEDICATIONS  --------------------------------------------------------------------------------  Allergies    No Known Allergies    Intolerances      Standing Inpatient Medications  allopurinol 100 milliGRAM(s) Oral daily  apixaban 5 milliGRAM(s) Oral every 12 hours  finasteride 5 milliGRAM(s) Oral daily  metoprolol tartrate 25 milliGRAM(s) Oral every 6 hours  polyethylene glycol 3350 17 Gram(s) Oral every 24 hours  senna 2 Tablet(s) Oral at bedtime  sodium chloride 0.45%. 1000 milliLiter(s) IV Continuous <Continuous>    PRN Inpatient Medications  acetaminophen     Tablet .. 650 milliGRAM(s) Oral every 6 hours PRN  melatonin 3 milliGRAM(s) Oral at bedtime PRN        VITALS/PHYSICAL EXAM  --------------------------------------------------------------------------------  T(C): 36.9 (05-14-25 @ 10:00), Max: 37.2 (05-13-25 @ 21:16)  HR: 98 (05-14-25 @ 11:05) (51 - 150)  BP: 114/56 (05-14-25 @ 11:05) (75/50 - 149/73)  RR: 19 (05-14-25 @ 08:56) (16 - 19)  SpO2: 92% (05-14-25 @ 11:05) (90% - 100%)  Wt(kg): --  Height (cm): 180.3 (05-13-25 @ 12:36)  Weight (kg): 93 (05-13-25 @ 12:36)  BMI (kg/m2): 28.6 (05-13-25 @ 12:36)  BSA (m2): 2.13 (05-13-25 @ 12:36)      05-13-25 @ 07:01  -  05-14-25 @ 07:00  --------------------------------------------------------  IN: 3000 mL / OUT: 80689 mL / NET: -9525 mL    05-14-25 @ 07:01  -  05-14-25 @ 12:17  --------------------------------------------------------  IN: 800 mL / OUT: 725 mL / NET: 75 mL        PHYSICAL EXAM:  Constitutional: NAD, lying in bed  Head: NCAT, EOMI  Respiratory: CTAB, no respiratory distress  Cardiac: RRR, +S1/S2  Gastrointestinal: abdomen soft, distended  Genitourinary: +capps  Extremities: No LE edema  Dermatologic: no rash on exposed skin  Neurologic: Awake, alert, interactive      LABS/STUDIES  --------------------------------------------------------------------------------              11.9   6.35  >-----------<  251      [05-14-25 @ 04:35]              35.2     142  |  109  |  63  ----------------------------<  137      [05-14-25 @ 10:00]  4.2   |  23  |  3.48        Ca     7.9     [05-14-25 @ 10:00]      Mg     2.2     [05-14-25 @ 04:35]      Phos  4.2     [05-14-25 @ 04:35]    TPro  6.1  /  Alb  3.2  /  TBili  0.4  /  DBili  x   /  AST  13  /  ALT  7   /  AlkPhos  83  [05-13-25 @ 16:27]        Serum Osmolality 310      [05-13-25 @ 16:27]    Creatinine Trend:  SCr 3.48 [05-14 @ 10:00]  SCr 4.61 [05-14 @ 04:35]  SCr 6.13 [05-13 @ 23:50]  SCr 7.62 [05-13 @ 20:29]  SCr 9.13 [05-13 @ 16:27]    Urinalysis - [05-14-25 @ 10:00]      Color  / Appearance  / SG  / pH       Gluc 137 / Ketone   / Bili  / Urobili        Blood  / Protein  / Leuk Est  / Nitrite       RBC  / WBC  / Hyaline  / Gran  / Sq Epi  / Non Sq Epi  / Bacteria     Urine Creatinine 47      [05-13-25 @ 15:55]  Urine Protein 11      [05-13-25 @ 15:55]  Urine Sodium 21      [05-13-25 @ 15:55]  Urine Urea Nitrogen 299      [05-13-25 @ 15:55]  Urine Potassium 28      [05-13-25 @ 15:55]  Urine Osmolality 209      [05-13-25 @ 15:55]    TSH 5.090      [05-14-25 @ 04:35]       Seen and evaluated at bedside, no overnight events reported by primary team   UOP ~ 11.0L    REVIEW OF SYSTEMS  --------------------------------------------------------------------------------  Gen: No fevers/chills, weakness  Skin: No rashes  Head/Eyes/Ears/Mouth: No headache; No hearing changes, mucous discharge, vision changes  Respiratory: No dyspnea, cough, wheezing  CV: No chest pain, palpitations  GI: No abdominal pain, diarrhea, constipation, nausea, vomiting, melena, hematochezia  : No increased frequency, dysuria, hematuria  MSK: No joint pain/swelling; no back pain; no edema  Neuro: No dizziness/lightheadedness, weakness, seizures, numbness  Heme: No easy bruising or bleeding    All other systems were reviewed and are negative, except as noted.    PAST HISTORY  --------------------------------------------------------------------------------  No significant changes to PMH, PSH, FHx, SHx, unless otherwise noted    ALLERGIES & MEDICATIONS  --------------------------------------------------------------------------------  Allergies    No Known Allergies    Intolerances      Standing Inpatient Medications  allopurinol 100 milliGRAM(s) Oral daily  apixaban 5 milliGRAM(s) Oral every 12 hours  finasteride 5 milliGRAM(s) Oral daily  metoprolol tartrate 25 milliGRAM(s) Oral every 6 hours  polyethylene glycol 3350 17 Gram(s) Oral every 24 hours  senna 2 Tablet(s) Oral at bedtime  sodium chloride 0.45%. 1000 milliLiter(s) IV Continuous <Continuous>    PRN Inpatient Medications  acetaminophen     Tablet .. 650 milliGRAM(s) Oral every 6 hours PRN  melatonin 3 milliGRAM(s) Oral at bedtime PRN        VITALS/PHYSICAL EXAM  --------------------------------------------------------------------------------  T(C): 36.9 (05-14-25 @ 10:00), Max: 37.2 (05-13-25 @ 21:16)  HR: 98 (05-14-25 @ 11:05) (51 - 150)  BP: 114/56 (05-14-25 @ 11:05) (75/50 - 149/73)  RR: 19 (05-14-25 @ 08:56) (16 - 19)  SpO2: 92% (05-14-25 @ 11:05) (90% - 100%)  Wt(kg): --  Height (cm): 180.3 (05-13-25 @ 12:36)  Weight (kg): 93 (05-13-25 @ 12:36)  BMI (kg/m2): 28.6 (05-13-25 @ 12:36)  BSA (m2): 2.13 (05-13-25 @ 12:36)      05-13-25 @ 07:01  -  05-14-25 @ 07:00  --------------------------------------------------------  IN: 3000 mL / OUT: 63301 mL / NET: -9525 mL    05-14-25 @ 07:01  -  05-14-25 @ 12:17  --------------------------------------------------------  IN: 800 mL / OUT: 725 mL / NET: 75 mL        PHYSICAL EXAM:  Constitutional: NAD, lying in bed  Head: NCAT, EOMI  Respiratory: CTAB, no respiratory distress  Cardiac: RRR, +S1/S2  Gastrointestinal: abdomen soft, distended  Genitourinary: +capps  Extremities: No LE edema  Dermatologic: no rash on exposed skin  Neurologic: Awake, alert, interactive      LABS/STUDIES  --------------------------------------------------------------------------------              11.9   6.35  >-----------<  251      [05-14-25 @ 04:35]              35.2     142  |  109  |  63  ----------------------------<  137      [05-14-25 @ 10:00]  4.2   |  23  |  3.48        Ca     7.9     [05-14-25 @ 10:00]      Mg     2.2     [05-14-25 @ 04:35]      Phos  4.2     [05-14-25 @ 04:35]    TPro  6.1  /  Alb  3.2  /  TBili  0.4  /  DBili  x   /  AST  13  /  ALT  7   /  AlkPhos  83  [05-13-25 @ 16:27]        Serum Osmolality 310      [05-13-25 @ 16:27]    Creatinine Trend:  SCr 3.48 [05-14 @ 10:00]  SCr 4.61 [05-14 @ 04:35]  SCr 6.13 [05-13 @ 23:50]  SCr 7.62 [05-13 @ 20:29]  SCr 9.13 [05-13 @ 16:27]    Urinalysis - [05-14-25 @ 10:00]      Color  / Appearance  / SG  / pH       Gluc 137 / Ketone   / Bili  / Urobili        Blood  / Protein  / Leuk Est  / Nitrite       RBC  / WBC  / Hyaline  / Gran  / Sq Epi  / Non Sq Epi  / Bacteria     Urine Creatinine 47      [05-13-25 @ 15:55]  Urine Protein 11      [05-13-25 @ 15:55]  Urine Sodium 21      [05-13-25 @ 15:55]  Urine Urea Nitrogen 299      [05-13-25 @ 15:55]  Urine Potassium 28      [05-13-25 @ 15:55]  Urine Osmolality 209      [05-13-25 @ 15:55]    TSH 5.090      [05-14-25 @ 04:35]

## 2025-05-14 NOTE — CONSULT NOTE ADULT - CONSULT REASON
Urinary retention
Afib RVR
Hyperkalemia, Acute Renal Failure.
SHAKEEL, obstructive uropathy, hyperkalemia
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
## 2025-05-15 ENCOUNTER — RESULT REVIEW (OUTPATIENT)
Age: 85
End: 2025-05-15

## 2025-05-15 ENCOUNTER — TRANSCRIPTION ENCOUNTER (OUTPATIENT)
Age: 85
End: 2025-05-15

## 2025-05-15 VITALS — TEMPERATURE: 98 F

## 2025-05-15 LAB
ANION GAP SERPL CALC-SCNC: 11 MMOL/L — SIGNIFICANT CHANGE UP (ref 5–17)
BASOPHILS # BLD AUTO: 0.03 K/UL — SIGNIFICANT CHANGE UP (ref 0–0.2)
BASOPHILS NFR BLD AUTO: 0.4 % — SIGNIFICANT CHANGE UP (ref 0–2)
BUN SERPL-MCNC: 34 MG/DL — HIGH (ref 7–23)
CALCIUM SERPL-MCNC: 8.4 MG/DL — SIGNIFICANT CHANGE UP (ref 8.4–10.5)
CHLORIDE SERPL-SCNC: 110 MMOL/L — HIGH (ref 96–108)
CO2 SERPL-SCNC: 23 MMOL/L — SIGNIFICANT CHANGE UP (ref 22–31)
CREAT SERPL-MCNC: 1.68 MG/DL — HIGH (ref 0.5–1.3)
EGFR: 40 ML/MIN/1.73M2 — LOW
EGFR: 40 ML/MIN/1.73M2 — LOW
EOSINOPHIL # BLD AUTO: 0.25 K/UL — SIGNIFICANT CHANGE UP (ref 0–0.5)
EOSINOPHIL NFR BLD AUTO: 3.3 % — SIGNIFICANT CHANGE UP (ref 0–6)
GLUCOSE SERPL-MCNC: 112 MG/DL — HIGH (ref 70–99)
HCT VFR BLD CALC: 33.3 % — LOW (ref 39–50)
HGB BLD-MCNC: 10.8 G/DL — LOW (ref 13–17)
IMM GRANULOCYTES NFR BLD AUTO: 0.4 % — SIGNIFICANT CHANGE UP (ref 0–0.9)
LYMPHOCYTES # BLD AUTO: 1.69 K/UL — SIGNIFICANT CHANGE UP (ref 1–3.3)
LYMPHOCYTES # BLD AUTO: 22.3 % — SIGNIFICANT CHANGE UP (ref 13–44)
MAGNESIUM SERPL-MCNC: 2.2 MG/DL — SIGNIFICANT CHANGE UP (ref 1.6–2.6)
MCHC RBC-ENTMCNC: 28.1 PG — SIGNIFICANT CHANGE UP (ref 27–34)
MCHC RBC-ENTMCNC: 32.4 G/DL — SIGNIFICANT CHANGE UP (ref 32–36)
MCV RBC AUTO: 86.7 FL — SIGNIFICANT CHANGE UP (ref 80–100)
MONOCYTES # BLD AUTO: 0.83 K/UL — SIGNIFICANT CHANGE UP (ref 0–0.9)
MONOCYTES NFR BLD AUTO: 10.9 % — SIGNIFICANT CHANGE UP (ref 2–14)
NEUTROPHILS # BLD AUTO: 4.75 K/UL — SIGNIFICANT CHANGE UP (ref 1.8–7.4)
NEUTROPHILS NFR BLD AUTO: 62.7 % — SIGNIFICANT CHANGE UP (ref 43–77)
NRBC BLD AUTO-RTO: 0 /100 WBCS — SIGNIFICANT CHANGE UP (ref 0–0)
PHOSPHATE SERPL-MCNC: 2.7 MG/DL — SIGNIFICANT CHANGE UP (ref 2.5–4.5)
PLATELET # BLD AUTO: 278 K/UL — SIGNIFICANT CHANGE UP (ref 150–400)
POTASSIUM SERPL-MCNC: 4 MMOL/L — SIGNIFICANT CHANGE UP (ref 3.5–5.3)
POTASSIUM SERPL-SCNC: 4 MMOL/L — SIGNIFICANT CHANGE UP (ref 3.5–5.3)
RBC # BLD: 3.84 M/UL — LOW (ref 4.2–5.8)
RBC # FLD: 13.4 % — SIGNIFICANT CHANGE UP (ref 10.3–14.5)
SODIUM SERPL-SCNC: 144 MMOL/L — SIGNIFICANT CHANGE UP (ref 135–145)
WBC # BLD: 7.58 K/UL — SIGNIFICANT CHANGE UP (ref 3.8–10.5)
WBC # FLD AUTO: 7.58 K/UL — SIGNIFICANT CHANGE UP (ref 3.8–10.5)

## 2025-05-15 PROCEDURE — 99232 SBSQ HOSP IP/OBS MODERATE 35: CPT

## 2025-05-15 PROCEDURE — 76377 3D RENDER W/INTRP POSTPROCES: CPT | Mod: 26

## 2025-05-15 PROCEDURE — 93306 TTE W/DOPPLER COMPLETE: CPT | Mod: 26

## 2025-05-15 PROCEDURE — 99232 SBSQ HOSP IP/OBS MODERATE 35: CPT | Mod: GC

## 2025-05-15 RX ADMIN — METOPROLOL SUCCINATE 25 MILLIGRAM(S): 50 TABLET, EXTENDED RELEASE ORAL at 12:56

## 2025-05-15 RX ADMIN — POLYETHYLENE GLYCOL 3350 17 GRAM(S): 17 POWDER, FOR SOLUTION ORAL at 08:33

## 2025-05-15 RX ADMIN — Medication 100 MILLIGRAM(S): at 12:55

## 2025-05-15 RX ADMIN — APIXABAN 2.5 MILLIGRAM(S): 2.5 TABLET, FILM COATED ORAL at 05:38

## 2025-05-15 RX ADMIN — METOPROLOL SUCCINATE 25 MILLIGRAM(S): 50 TABLET, EXTENDED RELEASE ORAL at 05:38

## 2025-05-15 RX ADMIN — FINASTERIDE 5 MILLIGRAM(S): 1 TABLET, FILM COATED ORAL at 12:56

## 2025-05-15 NOTE — PROGRESS NOTE ADULT - SUBJECTIVE AND OBJECTIVE BOX
INTERVAL EVENTS:    Cardiac medications administered in the last 48h:  metoprolol tartrate: 25 milliGRAM(s) Oral (05-15-25 @ 05:38)  metoprolol tartrate: 25 milliGRAM(s) Oral (05-14-25 @ 17:06)  metoprolol tartrate: 25 milliGRAM(s) Oral (05-14-25 @ 11:03)  metoprolol tartrate Injectable: 5 milliGRAM(s) IV Push (05-14-25 @ 10:14)  diltiazem    Tablet: 60 milliGRAM(s) Oral (05-14-25 @ 05:38)  diltiazem Injectable: 20 milliGRAM(s) IV Push (05-14-25 @ 05:11)  metoprolol tartrate: 50 milliGRAM(s) Oral (05-14-25 @ 04:26)  metoprolol tartrate Injectable: 5 milliGRAM(s) IV Push (05-14-25 @ 04:25)  metoprolol tartrate Injectable: 5 milliGRAM(s) IV Push (05-14-25 @ 03:55)  furosemide   Injectable: 60 milliGRAM(s) IV Push (05-13-25 @ 16:59)      MEDICATIONS  (STANDING):  allopurinol 100 milliGRAM(s) Oral daily  apixaban 2.5 milliGRAM(s) Oral every 12 hours  finasteride 5 milliGRAM(s) Oral daily  metoprolol tartrate 25 milliGRAM(s) Oral every 6 hours  polyethylene glycol 3350 17 Gram(s) Oral every 24 hours  senna 2 Tablet(s) Oral at bedtime  silodosin 8 milliGRAM(s) Oral at bedtime  sodium chloride 0.45%. 1000 milliLiter(s) (100 mL/Hr) IV Continuous <Continuous>    MEDICATIONS  (PRN):  acetaminophen     Tablet .. 650 milliGRAM(s) Oral every 6 hours PRN Temp greater or equal to 38C (100.4F), Mild Pain (1 - 3)  melatonin 3 milliGRAM(s) Oral at bedtime PRN Insomnia      VITALS 24H  T(F): 97.9 (05-15-25 @ 05:58), Max: 98.4 (05-14-25 @ 10:00)  HR: 58 (05-15-25 @ 08:00) (58 - 150)  BP: 149/63 (05-15-25 @ 08:00) (97/49 - 149/63)  BP(mean): 91 (05-15-25 @ 08:00) (70 - 91)  ABP: --  ABP(mean): --  RR: 18 (05-15-25 @ 08:00) (18 - 18)  SpO2: 95% (05-15-25 @ 08:00) (91% - 98%)  CVP(mm Hg): --    I/O Detail 24H    14 May 2025 07:01  -  15 May 2025 07:00  --------------------------------------------------------  IN:    sodium chloride 0.45%: 900 mL    sodium chloride 0.9%: 600 mL  Total IN: 1500 mL    OUT:    Indwelling Catheter - Urethral (mL): 1710 mL  Total OUT: 1710 mL    Total NET: -210 mL          PHYSICAL EXAM:  GEN: NAD  HEENT: EOMI   RESP: CTA b/l  CV: RRR. Normal S1/S2. No m/r/g.  ABD: soft, non-distended  EXT: No edema   NEURO: alert and attentive    LABS:  CBC 05-15-25 @ 05:30                        10.8   7.58  )-----------( 278                   33.3     Hgb trend: 10.8 <-- , 11.9 <-- , 11.2 <--   WBC trend: 7.58 <-- , 6.35 <-- , 11.32 <--       CMP 05-15-25 @ 05:30    144  |  110[H]  |  34[H]  ----------------------------<  112[H]  4.0   |  23  |  1.68[H]    Ca    8.4      05-15-25 @ 05:30  Phos  2.7     05-15  Mg     2.2     05-15    TPro  6.1  /  Alb  3.2[L]  /  TBili  0.4  /  DBili  x   /  AST  13  /  ALT  7[L]  /  AlkPhos  83     05-13    Serum Cr (eGFR) trend: 1.68 (40) <-- , 2.32 (27) <-- , 2.80 (21) <-- , 3.48 (17) <-- , 4.61 (12) <-- , 6.13 (8) <-- , 7.62 (6) <-- , 9.13 (5) <-- , 10.89 (4) <--           Cardiac Markers

## 2025-05-15 NOTE — DISCHARGE NOTE PROVIDER - HOSPITAL COURSE
[ ASSESSMENT/1L (came w..., found to have...) ]     Hospital course (problem-based):    New medications:   Changes to old medications:  Items to follow up outpatient:  Physical exam at time of discharge:   84-year-old male with past medical history of A-fib on Eliquis, hypertension, BPH, gout who underwent right inguinal hernia repair on 5/8 with post procedural urinary retention s/p capps with post-obstructive diuresis, admitted to [facility] for monitoring of post-ob diuresis and afib RVR. Post-ob diuresis monitored for 48 hrs with decrease in UOP, pt will be discharged with capps. Afib RVR managed by cardiology and improved with lopressor 25mg q6h.      #Atrial Fibrillation: on home meds Toprol 25 daily and Eliquis 5 g PO BID.   pt stopped taking eliquis for 5d prior to procedure on 5/8 5/13 overnight: pt with afib rvr, given lopressor 5mg IVP x2, s/p cardizem 60mg POx1  cardiology consulted, recs appreciated -> increased Lopressor 25mg q6h   TTE with **  c/w home eliquis    # Acute renal Failure secondary to obstructive uropathy with chronic BPH c/b hyperkalemia: Patient with chronic BPH with post procedural urinary retention on 5/8  presents with 1500ccs on bladder scan, s/p capps placement with 2L output. FE urea/FENA with intrinsic disease, likely in the setting of severe obstruction.   on admission Cr 10 -> 1.8 on discharge  Fluid resuscitated appropriately  will be discharged with capps -> close f/u outpatient    # Hyperkalemia: From acute renal failure no EKG changes noted.  - Management with Calcium Gluconate 2 grams, Insulin 5 units IVP, Albuterol inhaler, Lasix per Nephrology   lokelma 10mg x1 given  RESOLVED    # Hyponatremia: Na of 127, patient with elevated total body water given renal failure vs SIADH from urinary obstruction. Interestingly serum osmolarity elevated at 310 though likely with increased BUN, urine osm low at 208 though this is after obstruction was relieved.  Patient asymptomatic. Lasix administered in the ED.   RESOLVED    # Gout: Held home allopurinol 300 mg daily -> c/w home allopurinol    Urology:   # BPH: Given Acute renal failure hold Finasteride 5mg PO daily and Silodosin 8.   - c/w home meds  - c/w capps  - f/u outpatient with urology    New medications: None  Changes to old medications: None  Items to follow up outpatient: PCP, urology  Physical exam at time of discharge:   84-year-old male with past medical history of A-fib on Eliquis, hypertension, BPH, gout who underwent right inguinal hernia repair on 5/8 with post procedural urinary retention s/p capps with post-obstructive diuresis, admitted to [facility] for monitoring of post-ob diuresis and afib RVR. Post-ob diuresis monitored for 48 hrs with decrease in UOP, pt will be discharged with capps. Afib RVR managed by cardiology and improved with lopressor 25mg q6h.      #Atrial Fibrillation: on home meds Toprol 25 daily and Eliquis 5 g PO BID.   pt stopped taking eliquis for 5d prior to procedure on 5/8 5/13 overnight: pt with afib rvr, given lopressor 5mg IVP x2, s/p cardizem 60mg POx1  cardiology consulted, recs appreciated -> increased Lopressor 25mg q6h   c/w home eliquis    # Acute renal Failure secondary to obstructive uropathy with chronic BPH c/b hyperkalemia: Patient with chronic BPH with post procedural urinary retention on 5/8  presents with 1500ccs on bladder scan, s/p capps placement with 2L output. FE urea/FENA with intrinsic disease, likely in the setting of severe obstruction.   on admission Cr 10 -> 1.8 on discharge  Fluid resuscitated appropriately  will be discharged with capps -> close f/u outpatient    # Hyperkalemia: From acute renal failure no EKG changes noted.  - Management with Calcium Gluconate 2 grams, Insulin 5 units IVP, Albuterol inhaler, Lasix per Nephrology   lokelma 10mg x1 given  RESOLVED    # Hyponatremia: Na of 127, patient with elevated total body water given renal failure vs SIADH from urinary obstruction. Interestingly serum osmolarity elevated at 310 though likely with increased BUN, urine osm low at 208 though this is after obstruction was relieved.  Patient asymptomatic. Lasix administered in the ED.   RESOLVED    # Gout: Held home allopurinol 300 mg daily -> c/w home allopurinol    Urology:   # BPH: Given Acute renal failure hold Finasteride 5mg PO daily and Silodosin 8.   - c/w home meds  - c/w capps  - f/u outpatient with urology    New medications: None  Changes to old medications: None  Items to follow up outpatient: PCP, urology  Physical exam at time of discharge:   84-year-old male with past medical history of A-fib on Eliquis, hypertension, BPH, gout who underwent right inguinal hernia repair on 5/8 with post procedural urinary retention s/p capps with post-obstructive diuresis, admitted to [facility] for monitoring of post-ob diuresis and afib RVR. Post-ob diuresis monitored for 48 hrs with decrease in UOP, pt will be discharged with capps. Afib RVR managed by cardiology and improved with lopressor 25mg q6h.      #Atrial Fibrillation: on home meds Toprol 25 daily and Eliquis 5 g PO BID.   pt stopped taking eliquis for 5d prior to procedure on 5/8 5/13 overnight: pt with afib rvr, given lopressor 5mg IVP x2, s/p cardizem 60mg POx1  cardiology consulted, recs appreciated -> increased Lopressor 25mg q6h   c/w home eliquis    # Acute renal Failure secondary to obstructive uropathy with chronic BPH c/b hyperkalemia: Patient with chronic BPH with post procedural urinary retention on 5/8  presents with 1500ccs on bladder scan, s/p capps placement with 2L output. FE urea/FENA with intrinsic disease, likely in the setting of severe obstruction.   on admission Cr 10 -> 1.8 on discharge  Fluid resuscitated appropriately  will be discharged with capps -> close f/u outpatient with urology    # Hyperkalemia: From acute renal failure no EKG changes noted.  - Management with Calcium Gluconate 2 grams, Insulin 5 units IVP, Albuterol inhaler, Lasix per Nephrology   lokelma 10mg x1 given  RESOLVED    # Hyponatremia: Na of 127, patient with elevated total body water given renal failure vs SIADH from urinary obstruction. Interestingly serum osmolarity elevated at 310 though likely with increased BUN, urine osm low at 208 though this is after obstruction was relieved.  Patient asymptomatic. Lasix administered in the ED.   RESOLVED    # Gout: Held home allopurinol 300 mg daily -> c/w home allopurinol    Urology:   # BPH: Given Acute renal failure hold Finasteride 5mg PO daily and Silodosin 8.   - c/w home meds  - c/w capps  - f/u outpatient with urology    New medications: None  Changes to old medications: None  Items to follow up outpatient: PCP, urology  Physical exam at time of discharge:   84-year-old male with past medical history of A-fib on Eliquis, hypertension, BPH, gout who underwent right inguinal hernia repair on 5/8 with post procedural urinary retention s/p capps with post-obstructive diuresis, admitted to [facility] for monitoring of post-ob diuresis and afib RVR. Post-ob diuresis monitored for 48 hrs with decrease in UOP, pt will be discharged with capps. Afib RVR managed by cardiology and improved with lopressor 25mg q6h.      #Atrial Fibrillation: on home meds Lopressor 50mg BID and Eliquis 5 g PO BID.   pt stopped taking eliquis for 5d prior to procedure on 5/8 5/13 overnight: pt with afib rvr, given lopressor 5mg IVP x2, s/p cardizem 60mg POx1  cardiology consulted, recs appreciated -> increased Lopressor 25mg q6h - pt HR stable in 60s  c/w home eliquis 5mg BID  c/w home lopressor 50mg BID    # Acute renal Failure secondary to obstructive uropathy with chronic BPH c/b hyperkalemia: Patient with chronic BPH with post procedural urinary retention on 5/8  presents with 1500ccs on bladder scan, s/p capps placement with 2L output. FE urea/FENA with intrinsic disease, likely in the setting of severe obstruction.   on admission Cr 10 -> 1.8 on discharge  Fluid resuscitated appropriately  will be discharged with capps -> close f/u outpatient with urology    # Hyperkalemia: From acute renal failure no EKG changes noted.  - Management with Calcium Gluconate 2 grams, Insulin 5 units IVP, Albuterol inhaler, Lasix per Nephrology   lokelma 10mg x1 given  RESOLVED    # Hyponatremia: Na of 127, patient with elevated total body water given renal failure vs SIADH from urinary obstruction. Interestingly serum osmolarity elevated at 310 though likely with increased BUN, urine osm low at 208 though this is after obstruction was relieved.  Patient asymptomatic. Lasix administered in the ED.   RESOLVED    # Gout: Held home allopurinol 300 mg daily -> c/w home allopurinol    Urology:   # BPH: Given Acute renal failure hold Finasteride 5mg PO daily and Silodosin 8.   - c/w home meds  - c/w capps  - f/u outpatient with urology    New medications: None  Changes to old medications: None  Items to follow up outpatient: PCP, urology  Physical exam at time of discharge:  General: NAD  HEENT: PERRL, EOM intact, sclera anicteric, MMM  Cardiovascular: irregularly irregular; no MRG;   Respiratory: CTAB; no WRR  GI/: soft; NTND; BS x4  Extremities: WWP; 2+ peripheral pulses bilaterally; no LE edema  Skin: normal color & turgor; no rash  Neurologic: aox3; no focal deficits

## 2025-05-15 NOTE — PROGRESS NOTE ADULT - ASSESSMENT
84M PMH apical HCM, atrial flutter on eliquis (held since 5/2), HTN who  underwent right inguinal hernia repair on 5/8 with post procedural urinary retention, now with post-obstructive autodiuresis. Course c/b atrial flutter with RVR.     REVIEW OF STUDIES  NST 04/25/2025: Normal myocardial perfusion scan, with no evidence of infarction or inducible ischemia.The patient underwent stress testing using the standard [place] protocol.• The patient exercised for 4 min.• The test was stopped due to fatigue.• The peak heart rate was 135 bpm; 100 % of predicted maximal heart rate for this patient.• The patient achieved 5.5 METS.3.Baseline electrocardiogram: normal sinus rhythm at a rate of 64 bpm with no arrhythmias and nonspecific ST-T wave abnormalities.4.Stress electrocardiogram: No ischemic ST segment changes.5.Arrhythmias: No arrhythmia associated with stress.6.Normal left ventricular regional wall motion.7.The left ventricle is normal in function    TTE 4/1/2025: Left ventricular systolic function is normal with an ejection fraction visually estimated at 55 to 60 %. Mild mitral regurgitation. .Mild tricuspid regurgitation. 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 .Mild aortic regurgitation.6.Tricuspid aortic valve with normal leaflet excursion    #Atrial flutter  -Likely in setting of post-op state, volume depletion from large volume urine output  -Tele reveals periods of atrial flutter with RVR with sponteneous conversions to NSR  -Start lopressor 25mg q6h (home 50mg BID)  -If flutter with RVR persists, start amiodarone 400mg BID to maintain NSR  -Continue indefinite AC given apical HCM  -Match volume losses as per nephrology recs 84M PMH apical HCM, atrial flutter on eliquis (held since 5/2), HTN who  underwent right inguinal hernia repair on 5/8 with post procedural urinary retention, now with post-obstructive autodiuresis. Course c/b atrial flutter with RVR.     REVIEW OF STUDIES  NST 04/25/2025: Normal myocardial perfusion scan, with no evidence of infarction or inducible ischemia.The patient underwent stress testing using the standard [place] protocol.• The patient exercised for 4 min.• The test was stopped due to fatigue.• The peak heart rate was 135 bpm; 100 % of predicted maximal heart rate for this patient.• The patient achieved 5.5 METS.3.Baseline electrocardiogram: normal sinus rhythm at a rate of 64 bpm with no arrhythmias and nonspecific ST-T wave abnormalities.4.Stress electrocardiogram: No ischemic ST segment changes.5.Arrhythmias: No arrhythmia associated with stress.6.Normal left ventricular regional wall motion.7.The left ventricle is normal in function    TTE 4/1/2025: Left ventricular systolic function is normal with an ejection fraction visually estimated at 55 to 60 %. Mild mitral regurgitation. .Mild tricuspid regurgitation. 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 .Mild aortic regurgitation.6.Tricuspid aortic valve with normal leaflet excursion    #Atrial flutter  He self converted to NSR yesterday at 1730  -Resume home lopressor 50mg BID  -Continue indefinite AC given apical HCM    Ensure follow-up with his cardiologist within 1-2 weeks of discharge

## 2025-05-15 NOTE — DISCHARGE NOTE PROVIDER - CARE PROVIDER_API CALL
NIDHI CRUZ  [address] [place] [address]  Phone: [phone]  Fax: [phone]  Established Patient  Scheduled Appointment: 05/19/2025 11:00 AM   NIDHI CRUZ  [address] [place] [address]  Phone: [phone]  Fax: [phone]  Established Patient  Scheduled Appointment: 05/19/2025 11:00 AM    MÓNICA CHOW, Phys,    Phone: [phone]  Fax: [phone]  Follow Up Time: 1 week

## 2025-05-15 NOTE — PROGRESS NOTE ADULT - ASSESSMENT
83yo M w/ PMHx HTN, AFib on Eliquis, BPH w/p R inguinal hernia repair 5/8 c/b post-procedural urinary retention. Found in ED w/ Cr 10.9 from bl 1.12, K 7.8.   Capps placed w/ 2L UOP returned. ECG no changes. S/p CaGluc, insulin/dextrose/ lokelma w/ improvement.   Nephro consulted for mgmt of obstructive SHAKEEL w/ hyperkalemia, & post-obstructive diuresis.       #Postobstructive Diuresis #Obstructive Uropathy  Improving, first night of adm w/ ~11L UOP, w/ IVF aggressively titrated by primary team to replete 2/3 UOP. Now on 0.45% NaCl IVF @100/hr  UOP/last 24h ~1710cc, w/ initialy ~150-250cc/hr during daytime 5/14, w/ UOP decreasing ~6PM to 50-100cc/hr. BUN/Cr 34/1.68 today, sCr continuing to improve.    - Post obstructive diuresis appears to be resolving. Can monitor I/Os for 1 more day w/ capps cath then remove  - Can discontinue IV Fluids for now  - maintain sBop >110  - No barrier to discharge from renal standpoint      #Hyperkalemia  2/2 to above, Improved  K initially 7.8, improved >5.7 w/ interventions in ED as above. K 4.0 today, has stayed wnl Since 5/13 evening    - K now remaining wnl w/ improved renal fn  - No need for potassium restricted diet as long as renal fn remains improved       Discussed w/ Dr. Huey Enriquez

## 2025-05-15 NOTE — PROGRESS NOTE ADULT - SUBJECTIVE AND OBJECTIVE BOX
**INCOMPLETE NOTE    INTERVAL/OVERNIGHT EVENTS: None    SUBJECTIVE:  Patient seen and examined at bedside, comfortable, NAD. Denied fever, chest pain, dyspnea, abdominal pain.     Vital Signs Last 12 Hrs  T(F): 97.9 (05-15-25 @ 05:58), Max: 98.4 (05-15-25 @ 01:16)  HR: 66 (05-15-25 @ 03:52) (62 - 66)  BP: 124/58 (05-15-25 @ 03:52) (97/49 - 124/58)  BP(mean): 83 (05-15-25 @ 03:52) (70 - 86)  RR: 18 (05-15-25 @ 03:52) (18 - 18)  SpO2: 98% (05-15-25 @ 03:52) (92% - 98%)  I&O's Summary    14 May 2025 07:01  -  15 May 2025 07:00  --------------------------------------------------------  IN: 1500 mL / OUT: 1710 mL / NET: -210 mL        PHYSICAL EXAM:  General: NAD  HEENT: PERRL, EOM intact, sclera anicteric, MMM  Cardiovascular: RRR; no MRG;   Respiratory: CTAB; no WRR  GI/: soft; NTND; BS x4  Extremities: WWP; 2+ peripheral pulses bilaterally; no LE edema  Skin: normal color & turgor; no rash  Neurologic: aox3; no focal deficits    LABS:                        11.9   6.35  )-----------( 251      ( 14 May 2025 04:35 )             35.2     05-14    145  |  110[H]  |  48[H]  ----------------------------<  110[H]  4.2   |  23  |  2.32[H]    Ca    8.2[L]      14 May 2025 20:01  Phos  4.2     05-14  Mg     2.2     05-14    TPro  6.1  /  Alb  3.2[L]  /  TBili  0.4  /  DBili  x   /  AST  13  /  ALT  7[L]  /  AlkPhos  83  05-13      Urinalysis Basic - ( 14 May 2025 20:01 )    Color: x / Appearance: x / SG: x / pH: x  Gluc: 110 mg/dL / Ketone: x  / Bili: x / Urobili: x   Blood: x / Protein: x / Nitrite: x   Leuk Esterase: x / RBC: x / WBC x   Sq Epi: x / Non Sq Epi: x / Bacteria: x          RADIOLOGY & ADDITIONAL TESTS:    MEDICATIONS  (STANDING):  allopurinol 100 milliGRAM(s) Oral daily  apixaban 2.5 milliGRAM(s) Oral every 12 hours  finasteride 5 milliGRAM(s) Oral daily  metoprolol tartrate 25 milliGRAM(s) Oral every 6 hours  polyethylene glycol 3350 17 Gram(s) Oral every 24 hours  senna 2 Tablet(s) Oral at bedtime  silodosin 8 milliGRAM(s) Oral at bedtime  sodium chloride 0.45%. 1000 milliLiter(s) (100 mL/Hr) IV Continuous <Continuous>    MEDICATIONS  (PRN):  acetaminophen     Tablet .. 650 milliGRAM(s) Oral every 6 hours PRN Temp greater or equal to 38C (100.4F), Mild Pain (1 - 3)  melatonin 3 milliGRAM(s) Oral at bedtime PRN Insomnia

## 2025-05-15 NOTE — DISCHARGE NOTE NURSING/CASE MANAGEMENT/SOCIAL WORK - PATIENT PORTAL LINK FT
You can access the FollowMyHealth Patient Portal offered by [facility] by registering at the following website: [email or web address]. By joining [employer]’s FollowMyHealth portal, you will also be able to view your health information using other applications (apps) compatible with our system.

## 2025-05-15 NOTE — DISCHARGE NOTE NURSING/CASE MANAGEMENT/SOCIAL WORK - FINANCIAL ASSISTANCE
[facility] provides services at a reduced cost to those who are determined to be eligible through [facility]’s financial assistance program. Information regarding [facility]’s financial assistance program can be found by going to [email or web address] or by calling [phone].

## 2025-05-15 NOTE — DISCHARGE NOTE PROVIDER - NSDCFUSCHEDAPPT_GEN_ALL_CORE_FT
Andrew Nicholson  [facility]  [facility] [address]  Scheduled Appointment: 05/21/2025

## 2025-05-15 NOTE — CHART NOTE - NSCHARTNOTEFT_GEN_A_CORE
Pt is 83yo M w/ PMHx HTN, AFib on Eliquis, BPH w/p R inguinal hernia repair 5/8 c/b post-procedural urinary retention. Found in ED w/ Cr 10.9 from bl 1.12, K 7.8. Samson placed w/ 2L UOP returned. ECG without hyperK+ associated changes. S/p CaGluc, insulin/dextrose/ Lokelma w/ improvement. Nephrology consulted for mgmt of obstructive SHAKEEL w/ hyperkalemia, & post-obstructive diuresis.     #Postobstructive Diuresis #Obstructive Uropathy  Improving, first night of adm w/ ~11L UOP, w/ IVF titrated by primary team to replete 2/3 UOP. Now on 0.45% NaCl IVF @100/hr  UOP/last 24h ~1710cc. UOP started slowing ~5/14 evening to ~50-100cc/hr. BUN/Cr 34/1.68 today, sCr continuing to improve.      - Can discontinue IV Fluids for now  - maintain sBP >110  - No need to repeat BMP if planned for discharge      #Hyperkalemia  2/2 to above, Improved  K initially 7.8, improved >5.7 w/ interventions in ED as above. K 4.0 today, has stayed wnl Since 5/13 evening    - K now remaining wnl w/ improved renal fn  - No need for potassium restricted diet as long as renal fn remains improved     Nephrology to sign off at this time. Please re-consult as needed.   Discussed w/ Dr. Huey Enriquez NEPHROLOGY    Pt is 85yo M w/ PMHx HTN, AFib on Eliquis, BPH w/p R inguinal hernia repair 5/8 c/b post-procedural urinary retention. Found in ED w/ Cr 10.9 from bl 1.12, K 7.8. Samson placed w/ 2L UOP returned. ECG without hyperK+ associated changes. S/p CaGluc, insulin/dextrose/ Lokelma w/ improvement. Nephrology consulted for mgmt of obstructive SHAKEEL w/ hyperkalemia, & post-obstructive diuresis.     #Postobstructive Diuresis #Obstructive Uropathy  Improving, first night of adm w/ ~11L UOP, w/ IVF titrated by primary team to replete 2/3 UOP. Now on 0.45% NaCl IVF @100/hr  UOP/last 24h ~1710cc. UOP started slowing ~5/14 evening to ~50-100cc/hr. BUN/Cr 34/1.68 today, sCr continuing to improve.      - Can discontinue IV Fluids for now  - maintain sBP >110  - No need to repeat BMP if planned for discharge      #Hyperkalemia  2/2 to above, Improved  K initially 7.8, improved >5.7 w/ interventions in ED as above. K 4.0 today, has stayed wnl Since 5/13 evening    - K now remaining wnl w/ improved renal fn  - No need for potassium restricted diet as long as renal fn remains improved     Nephrology to sign off at this time. Please re-consult as needed.   Discussed w/ Dr. Huey Enriquez  Discussed w Primary team. 85yo M w/ PMHx HTN, AFib on Eliquis, BPH w/p R inguinal hernia repair 5/8 c/b post-procedural urinary retention. Found in ED w/ Cr 10.9 from bl 1.12, K 7.8. Capps placed w/ 2L UOP returned. ECG without hyperK+ associated changes. S/p CaGluc, insulin/dextrose/ Lokelma/lasix  w/ improvement. Nephrology following for obstructive SHAKEEL w/ hyperkalemia, & post-obstructive diuresis.     #Postobstructive Diuresis #Obstructive Uropathy- SCr peaked at 10.0 ranges   Now after capps insertion at 1.38   Recommend maintaining capps cath   Can discontinue IV Fluids for now as UOP ~ 1L (post obstructive diuresis might be resolving)  K+ and rest of electrolytes within acceptable ranges  Expect gradual renal recovery     Will sign off ~ reconsult as needed    At the time of discharge, please give a non urgent follow up appointment with nephrology     Discussed with primary team

## 2025-05-15 NOTE — DISCHARGE NOTE PROVIDER - CARE PROVIDERS DIRECT ADDRESSES
[email or web address]

## 2025-05-15 NOTE — DISCHARGE NOTE PROVIDER - NSDCCPCAREPLAN_GEN_ALL_CORE_FT
PRINCIPAL DISCHARGE DIAGNOSIS  Diagnosis: SHAKEEL (acute kidney injury)  Assessment and Plan of Treatment: You were admitted for acute kidney injury in the setting of post-procedure urinary retention.  Acute kidney injury (SHAKEEL) is a sudden episode of kidney failure or kidney damage that happens within a few hours or a few days. SHAKEEL causes a build-up of waste products in your blood and makes it hard for your kidneys to keep the right balance of fluid in your body. We placed a capps and monitored your urine output. You were also being hydrated with fluids. Your kidney markers have significantly improved. You will be discharged with a capps catheter.   ----------------------------  Please follow up with your urologist outpatient as scheduled  Capps care will be attached      SECONDARY DISCHARGE DIAGNOSES  Diagnosis: Acute renal failure  Assessment and Plan of Treatment:     Diagnosis: Hyperkalemia  Assessment and Plan of Treatment:     Diagnosis: Hyponatremia  Assessment and Plan of Treatment:      PRINCIPAL DISCHARGE DIAGNOSIS  Diagnosis: SHAKEEL (acute kidney injury)  Assessment and Plan of Treatment: You were admitted for acute kidney injury in the setting of post-procedure urinary retention.  Acute kidney injury (SHAKEEL) is a sudden episode of kidney failure or kidney damage that happens within a few hours or a few days. SHAKEEL causes a build-up of waste products in your blood and makes it hard for your kidneys to keep the right balance of fluid in your body. We placed a capps and monitored your urine output. You were also being hydrated with fluids. Your kidney markers have significantly improved. You will be discharged with a capps catheter.   ----------------------------  Please follow up with your urologist outpatient as scheduled  Please follow up with your primary care provider as scheduled  Capps care as below        SECONDARY DISCHARGE DIAGNOSES  Diagnosis: Capps catheter present  Assessment and Plan of Treatment: It’s important to thoroughly wash your hands with soap and water before and after handling a Capps catheter and collection bag. Washing your hands helps prevent the spread of germs that can cause an infection. You should also clean the catheter tube at least twice a day with soapy water and a wet paper towel or washcloth. Gently pat the tube dry.  Empty your collection bag every two to three hours. If you have a larger collection bag, empty it every eight hours.  To empty the bag:  - Wash your hands.  - Remove the stopper or open the clamp that keeps your collection bag shut.  - Empty the collection bag into a toilet. A healthcare provider may give you a container to measure how much pee you remove from your collection bag. If you have this container, empty your collection bag into the container, record the amount and empty the container into a toilet.  - Clean the drainage port with soap and water. Wipe away (down) from the drainage port to push germs away.  - Pat the drainage port dry.  - Replace the stopper or clamp.  - Wash your hands again.  To change the bag:  - Wash your hands.  - Empty the collection bag. If you have a measuring container, measure the amount of pee first.  - Use soap and water to clean the connection between your catheter and the collection bag. Wipe away to push germs away.  - Pinch the catheter tubing with your fingers just above the connection.  - Disconnect the bag from the catheter.  - Connect the new bag and release the tube.       PRINCIPAL DISCHARGE DIAGNOSIS  Diagnosis: SHAKEEL (acute kidney injury)  Assessment and Plan of Treatment: You were admitted for acute kidney injury in the setting of post-procedure urinary retention.  Acute kidney injury (SHAKEEL) is a sudden episode of kidney failure or kidney damage that happens within a few hours or a few days. SHAKEEL causes a build-up of waste products in your blood and makes it hard for your kidneys to keep the right balance of fluid in your body. We placed a capps and monitored your urine output. You were also being hydrated with fluids. Your kidney markers have significantly improved. You will be discharged with a capps catheter.   ----------------------------  Please follow up with your urologist outpatient as scheduled, Dr. Homar Reynolds ([phone])  Please follow up with your primary care provider as scheduled on 05/19 at 11AM to repeat labs (BMP and thyroid labs, TSH elevated at 5)  Capps care as below        SECONDARY DISCHARGE DIAGNOSES  Diagnosis: Capps catheter present  Assessment and Plan of Treatment: It’s important to thoroughly wash your hands with soap and water before and after handling a Capps catheter and collection bag. Washing your hands helps prevent the spread of germs that can cause an infection. You should also clean the catheter tube at least twice a day with soapy water and a wet paper towel or washcloth. Gently pat the tube dry.  Empty your collection bag every two to three hours. If you have a larger collection bag, empty it every eight hours.  To empty the bag:  - Wash your hands.  - Remove the stopper or open the clamp that keeps your collection bag shut.  - Empty the collection bag into a toilet. A healthcare provider may give you a container to measure how much pee you remove from your collection bag. If you have this container, empty your collection bag into the container, record the amount and empty the container into a toilet.  - Clean the drainage port with soap and water. Wipe away (down) from the drainage port to push germs away.  - Pat the drainage port dry.  - Replace the stopper or clamp.  - Wash your hands again.  To change the bag:  - Wash your hands.  - Empty the collection bag. If you have a measuring container, measure the amount of pee first.  - Use soap and water to clean the connection between your catheter and the collection bag. Wipe away to push germs away.  - Pinch the catheter tubing with your fingers just above the connection.  - Disconnect the bag from the catheter.  - Connect the new bag and release the tube.       PRINCIPAL DISCHARGE DIAGNOSIS  Diagnosis: SHAKEEL (acute kidney injury)  Assessment and Plan of Treatment: You were admitted for acute kidney injury in the setting of post-procedure urinary retention.  Acute kidney injury (SHAKEEL) is a sudden episode of kidney failure or kidney damage that happens within a few hours or a few days. SHAKEEL causes a build-up of waste products in your blood and makes it hard for your kidneys to keep the right balance of fluid in your body. We placed a capps and monitored your urine output. You were also being hydrated with fluids. Your kidney markers have significantly improved. You will be discharged with a capps catheter.   ----------------------------  Please follow up with your urologist outpatient as scheduled, Dr. Homar Reynolds ([phone])  Please follow up with your primary care provider as scheduled on 05/19 at 11AM to repeat labs (BMP and thyroid labs, TSH elevated at 5)  Capps care as below  Continue all other medications as previously taking        SECONDARY DISCHARGE DIAGNOSES  Diagnosis: Capps catheter present  Assessment and Plan of Treatment: It’s important to thoroughly wash your hands with soap and water before and after handling a Capps catheter and collection bag. Washing your hands helps prevent the spread of germs that can cause an infection. You should also clean the catheter tube at least twice a day with soapy water and a wet paper towel or washcloth. Gently pat the tube dry.  Empty your collection bag every two to three hours. If you have a larger collection bag, empty it every eight hours.  To empty the bag:  - Wash your hands.  - Remove the stopper or open the clamp that keeps your collection bag shut.  - Empty the collection bag into a toilet. A healthcare provider may give you a container to measure how much pee you remove from your collection bag. If you have this container, empty your collection bag into the container, record the amount and empty the container into a toilet.  - Clean the drainage port with soap and water. Wipe away (down) from the drainage port to push germs away.  - Pat the drainage port dry.  - Replace the stopper or clamp.  - Wash your hands again.  To change the bag:  - Wash your hands.  - Empty the collection bag. If you have a measuring container, measure the amount of pee first.  - Use soap and water to clean the connection between your catheter and the collection bag. Wipe away to push germs away.  - Pinch the catheter tubing with your fingers just above the connection.  - Disconnect the bag from the catheter.  - Connect the new bag and release the tube.

## 2025-05-15 NOTE — DISCHARGE NOTE NURSING/CASE MANAGEMENT/SOCIAL WORK - NSDCPEELIQUIS_GEN_ALL_CORE
Apixaban/Eliquis - Compliance/Apixaban/Eliquis - Dietary Advice/Apixaban/Eliquis - Follow up monitoring/Apixaban/Eliquis - Potential for adverse drug reactions and interactions same name as above

## 2025-05-19 PROBLEM — Z09 S/P RIGHT INGUINAL HERNIA REPAIR, FOLLOW-UP EXAM: Status: ACTIVE | Noted: 2025-05-19

## 2025-05-21 ENCOUNTER — APPOINTMENT (OUTPATIENT)
Facility: CLINIC | Age: 85
End: 2025-05-21

## 2025-05-21 DIAGNOSIS — Z09 ENCOUNTER FOR FOLLOW-UP EXAMINATION AFTER COMPLETED TREATMENT FOR CONDITIONS OTHER THAN MALIGNANT NEOPLASM: ICD-10-CM

## 2025-05-22 DIAGNOSIS — N99.0 POSTPROCEDURAL (ACUTE) (CHRONIC) KIDNEY FAILURE: ICD-10-CM

## 2025-05-22 DIAGNOSIS — E87.1 HYPO-OSMOLALITY AND HYPONATREMIA: ICD-10-CM

## 2025-05-22 DIAGNOSIS — I48.92 UNSPECIFIED ATRIAL FLUTTER: ICD-10-CM

## 2025-05-22 DIAGNOSIS — D64.9 ANEMIA, UNSPECIFIED: ICD-10-CM

## 2025-05-22 DIAGNOSIS — M10.9 GOUT, UNSPECIFIED: ICD-10-CM

## 2025-05-22 DIAGNOSIS — N17.9 ACUTE KIDNEY FAILURE, UNSPECIFIED: ICD-10-CM

## 2025-05-22 DIAGNOSIS — R33.9 RETENTION OF URINE, UNSPECIFIED: ICD-10-CM

## 2025-05-22 DIAGNOSIS — I48.91 UNSPECIFIED ATRIAL FIBRILLATION: ICD-10-CM

## 2025-05-22 DIAGNOSIS — Z79.01 LONG TERM (CURRENT) USE OF ANTICOAGULANTS: ICD-10-CM

## 2025-05-22 DIAGNOSIS — E87.5 HYPERKALEMIA: ICD-10-CM

## 2025-05-22 DIAGNOSIS — R33.8 OTHER RETENTION OF URINE: ICD-10-CM

## 2025-05-22 DIAGNOSIS — E87.20 ACIDOSIS, UNSPECIFIED: ICD-10-CM

## 2025-05-22 DIAGNOSIS — I42.1 OBSTRUCTIVE HYPERTROPHIC CARDIOMYOPATHY: ICD-10-CM

## 2025-05-22 DIAGNOSIS — I10 ESSENTIAL (PRIMARY) HYPERTENSION: ICD-10-CM

## 2025-05-22 DIAGNOSIS — N40.1 BENIGN PROSTATIC HYPERPLASIA WITH LOWER URINARY TRACT SYMPTOMS: ICD-10-CM

## 2025-06-02 PROCEDURE — 83935 ASSAY OF URINE OSMOLALITY: CPT

## 2025-06-02 PROCEDURE — 87086 URINE CULTURE/COLONY COUNT: CPT

## 2025-06-02 PROCEDURE — C8929: CPT

## 2025-06-02 PROCEDURE — 83735 ASSAY OF MAGNESIUM: CPT

## 2025-06-02 PROCEDURE — 86901 BLOOD TYPING SEROLOGIC RH(D): CPT

## 2025-06-02 PROCEDURE — 84300 ASSAY OF URINE SODIUM: CPT

## 2025-06-02 PROCEDURE — 96375 TX/PRO/DX INJ NEW DRUG ADDON: CPT

## 2025-06-02 PROCEDURE — 84133 ASSAY OF URINE POTASSIUM: CPT

## 2025-06-02 PROCEDURE — 94640 AIRWAY INHALATION TREATMENT: CPT

## 2025-06-02 PROCEDURE — 82962 GLUCOSE BLOOD TEST: CPT

## 2025-06-02 PROCEDURE — 96374 THER/PROPH/DIAG INJ IV PUSH: CPT

## 2025-06-02 PROCEDURE — 86850 RBC ANTIBODY SCREEN: CPT

## 2025-06-02 PROCEDURE — 83930 ASSAY OF BLOOD OSMOLALITY: CPT

## 2025-06-02 PROCEDURE — 84540 ASSAY OF URINE/UREA-N: CPT

## 2025-06-02 PROCEDURE — 96376 TX/PRO/DX INJ SAME DRUG ADON: CPT

## 2025-06-02 PROCEDURE — 84100 ASSAY OF PHOSPHORUS: CPT

## 2025-06-02 PROCEDURE — 81001 URINALYSIS AUTO W/SCOPE: CPT

## 2025-06-02 PROCEDURE — 84156 ASSAY OF PROTEIN URINE: CPT

## 2025-06-02 PROCEDURE — 36415 COLL VENOUS BLD VENIPUNCTURE: CPT

## 2025-06-02 PROCEDURE — 80048 BASIC METABOLIC PNL TOTAL CA: CPT

## 2025-06-02 PROCEDURE — 85025 COMPLETE CBC W/AUTO DIFF WBC: CPT

## 2025-06-02 PROCEDURE — 84439 ASSAY OF FREE THYROXINE: CPT

## 2025-06-02 PROCEDURE — 93005 ELECTROCARDIOGRAM TRACING: CPT

## 2025-06-02 PROCEDURE — 84443 ASSAY THYROID STIM HORMONE: CPT

## 2025-06-02 PROCEDURE — 99285 EMERGENCY DEPT VISIT HI MDM: CPT | Mod: 25

## 2025-06-02 PROCEDURE — 80053 COMPREHEN METABOLIC PANEL: CPT

## 2025-06-02 PROCEDURE — 82570 ASSAY OF URINE CREATININE: CPT

## 2025-06-02 PROCEDURE — 86900 BLOOD TYPING SEROLOGIC ABO: CPT

## 2025-08-20 ENCOUNTER — APPOINTMENT (OUTPATIENT)
Facility: CLINIC | Age: 85
End: 2025-08-20

## 2025-08-20 DIAGNOSIS — Z09 ENCOUNTER FOR FOLLOW-UP EXAMINATION AFTER COMPLETED TREATMENT FOR CONDITIONS OTHER THAN MALIGNANT NEOPLASM: ICD-10-CM

## (undated) DEVICE — XI 12MM AND STAPLER CANNULA SEAL

## (undated) DEVICE — VENODYNE/SCD SLEEVE CALF MEDIUM

## (undated) DEVICE — TIP METZENBAUM SCISSOR MONOPOLAR ENDOCUT (ORANGE)

## (undated) DEVICE — GOWN ROYAL SILK XL

## (undated) DEVICE — STAPLER SKIN VISI-STAT 35 WIDE

## (undated) DEVICE — XI ARM FORCEP FENESTRATED BIPOLAR 8MM

## (undated) DEVICE — WARMING BLANKET LOWER ADULT

## (undated) DEVICE — DRAPE 3/4 SHEET 52X76"

## (undated) DEVICE — SYR ASEPTO

## (undated) DEVICE — SUT PDO 0 1/2 CIRCLE 22MM NDL 20CM

## (undated) DEVICE — DRAPE TOWEL BLUE 17" X 24"

## (undated) DEVICE — GLV 8 PROTEXIS (WHITE)

## (undated) DEVICE — XI SEAL UNIVERSIAL 5-12MM

## (undated) DEVICE — SUSPENSORY WITH LEG STRAPS LARGE

## (undated) DEVICE — DRAPE 1/2 SHEET 40X57"

## (undated) DEVICE — SUT PDO 2-0 1/2 CIRCLE 26MM NDL 15CM

## (undated) DEVICE — SUPP ATHLETIC MALE XLG 44-55IN

## (undated) DEVICE — Device

## (undated) DEVICE — NDL GRASPING DISP

## (undated) DEVICE — DRAPE TOP SHEET 53" X 101"

## (undated) DEVICE — XI OBTURATOR OPTICAL BLADELESS 8MM

## (undated) DEVICE — D HELP - CLEARVIEW CLEARIFY SYSTEM

## (undated) DEVICE — SYR LUER LOK 30CC

## (undated) DEVICE — DRSG DERMABOND 0.7ML

## (undated) DEVICE — TUBING STRYKER PNEUMOCLEAR SMOKE EVACUATION HIGH FLOW

## (undated) DEVICE — XI DRAPE ARM

## (undated) DEVICE — SUT MONOCRYL 4-0 27" PS-2 UNDYED

## (undated) DEVICE — MARKING PEN W RULER

## (undated) DEVICE — XI SCISSOR TIP COVER

## (undated) DEVICE — XI DRAPE COLUMN

## (undated) DEVICE — TROCAR APPLIED MEDICAL KII FIOS FIRST ENTRY 5MM X 100MM Z-THREAD